# Patient Record
Sex: FEMALE | Race: OTHER | NOT HISPANIC OR LATINO
[De-identification: names, ages, dates, MRNs, and addresses within clinical notes are randomized per-mention and may not be internally consistent; named-entity substitution may affect disease eponyms.]

---

## 2021-04-22 PROBLEM — Z00.00 ENCOUNTER FOR PREVENTIVE HEALTH EXAMINATION: Status: ACTIVE | Noted: 2021-04-22

## 2021-04-23 ENCOUNTER — APPOINTMENT (OUTPATIENT)
Dept: GERIATRICS | Facility: CLINIC | Age: 81
End: 2021-04-23
Payer: MEDICARE

## 2021-04-23 VITALS
OXYGEN SATURATION: 100 % | TEMPERATURE: 97.9 F | BODY MASS INDEX: 1.14 KG/M2 | SYSTOLIC BLOOD PRESSURE: 120 MMHG | HEART RATE: 90 BPM | HEIGHT: 58 IN | WEIGHT: 5.44 LBS | DIASTOLIC BLOOD PRESSURE: 60 MMHG

## 2021-04-23 DIAGNOSIS — F41.9 ANXIETY DISORDER, UNSPECIFIED: ICD-10-CM

## 2021-04-23 DIAGNOSIS — E78.5 HYPERLIPIDEMIA, UNSPECIFIED: ICD-10-CM
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
## 2021-04-23 DIAGNOSIS — F51.04 PSYCHOPHYSIOLOGIC INSOMNIA: ICD-10-CM

## 2021-04-23 PROCEDURE — 99205 OFFICE O/P NEW HI 60 MIN: CPT

## 2021-04-23 RX ORDER — CHOLECALCIFEROL (VITAMIN D3) 125 MCG
125 MCG CAPSULE ORAL
Qty: 90 | Refills: 3 | Status: ACTIVE | COMMUNITY
Start: 2021-04-23

## 2021-04-23 NOTE — PHYSICAL EXAM
[General Appearance - Alert] : alert [General Appearance - In No Acute Distress] : in no acute distress [General Appearance - Well Nourished] : well nourished [General Appearance - Well Developed] : well developed [Sclera] : the sclera and conjunctiva were normal [PERRL With Normal Accommodation] : pupils were equal in size, round, and reactive to light [Extraocular Movements] : extraocular movements were intact [Normal Oral Mucosa] : normal oral mucosa [No Oral Pallor] : no oral pallor [Neck Appearance] : the appearance of the neck was normal [Neck Cervical Mass (___cm)] : no neck mass was observed [Jugular Venous Distention Increased] : there was no jugular-venous distention [Respiration, Rhythm And Depth] : normal respiratory rhythm and effort [Exaggerated Use Of Accessory Muscles For Inspiration] : no accessory muscle use [Auscultation Breath Sounds / Voice Sounds] : lungs were clear to auscultation bilaterally [Apical Impulse] : the apical impulse was normal [Heart Rate And Rhythm] : heart rate was normal and rhythm regular [Heart Sounds] : normal S1 and S2 [Heart Sounds Gallop] : no gallops [Bowel Sounds] : normal bowel sounds [Abdomen Soft] : soft [Abdomen Tenderness] : non-tender [Cervical Lymph Nodes Enlarged Posterior Bilaterally] : posterior cervical [Cervical Lymph Nodes Enlarged Anterior Bilaterally] : anterior cervical [No CVA Tenderness] : no ~M costovertebral angle tenderness [Abnormal Walk] : normal gait [Nail Clubbing] : no clubbing  or cyanosis of the fingernails [Musculoskeletal - Swelling] : no joint swelling seen [Skin Color & Pigmentation] : normal skin color and pigmentation [Skin Turgor] : normal skin turgor [] : no rash [Affect] : the affect was normal [Mood] : the mood was normal

## 2021-04-23 NOTE — ASSESSMENT
[FreeTextEntry1] : check labs now - fasting\par start tylenol 1000mg BID standing up to TID PRN\par advil 1-2 times a day always with food\par given name for pain management to make appointment\par goal is to obtain records from all doctors [place] and [place]\par will get back to me about doctors names etc\par obtain vaccine records\par in future [facility] Annual physical
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
## 2021-04-23 NOTE — REVIEW OF SYSTEMS
[Fever] : no fever [Chills] : no chills [Eyesight Problems] : no eyesight problems [Discharge From Eyes] : no purulent discharge from the eyes [Nosebleeds] : no nosebleeds [Nasal Discharge] : no nasal discharge [Chest Pain] : no chest pain [Palpitations] : no palpitations [Cough] : no cough [SOB on Exertion] : no shortness of breath during exertion [Abdominal Pain] : no abdominal pain [Vomiting] : no vomiting [Pelvic Pain] : no pelvic pain [Dysmenorrhea] : no dysmenorrhea [Joint Swelling] : no joint swelling [Joint Stiffness] : no joint stiffness [Itching] : no itching [Change In A Mole] : no change in a mole [Confused] : no confusion [Convulsions] : no convulsions [Sleep Disturbances] : sleep disturbances [Anxiety] : anxiety [Depression] : no depression

## 2021-04-28 ENCOUNTER — RESULT REVIEW (OUTPATIENT)
Age: 81
End: 2021-04-28

## 2021-12-21 ENCOUNTER — APPOINTMENT (OUTPATIENT)
Dept: GERIATRICS | Facility: CLINIC | Age: 81
End: 2021-12-21
Payer: MEDICARE

## 2021-12-21 ENCOUNTER — LABORATORY RESULT (OUTPATIENT)
Age: 81
End: 2021-12-21

## 2021-12-21 VITALS
BODY MASS INDEX: 20.6 KG/M2 | DIASTOLIC BLOOD PRESSURE: 62 MMHG | HEART RATE: 81 BPM | WEIGHT: 89 LBS | OXYGEN SATURATION: 98 % | TEMPERATURE: 97.4 F | HEIGHT: 55 IN | SYSTOLIC BLOOD PRESSURE: 134 MMHG

## 2021-12-21 DIAGNOSIS — R53.1 WEAKNESS: ICD-10-CM

## 2021-12-21 PROCEDURE — 99214 OFFICE O/P EST MOD 30 MIN: CPT

## 2021-12-21 RX ORDER — MIRABEGRON 50 MG/1
50 TABLET, FILM COATED, EXTENDED RELEASE ORAL
Qty: 90 | Refills: 0 | Status: DISCONTINUED | COMMUNITY
Start: 2021-04-23 | End: 2021-12-21

## 2021-12-21 NOTE — REVIEW OF SYSTEMS
[Feeling Tired] : feeling tired [Eyesight Problems] : eyesight problems [Incontinence] : incontinence [Joint Swelling] : joint swelling [Difficulty Walking] : difficulty walking [Sleep Disturbances] : sleep disturbances [Anxiety] : anxiety [Negative] : Heme/Lymph [Depression] : depression [FreeTextEntry2] : probs sleep [FreeTextEntry9] : hands

## 2021-12-21 NOTE — ASSESSMENT
[FreeTextEntry1] : Dr LYMAN 0 4/28/21\par check labs now - fasting\par start tylenol 1000mg BID standing up to TID PRN\par advil 1-2 times a day always with food\par given name for pain management to make appointment\par goal is to obtain records from all doctors [place] and [place]\par will get back to me about doctors names etc\par obtain vaccine records\par in future [facility] Annual physical\par \par 12/21/21\par \par mms - 27/28 - does not know name of hospital\par gds 15/30\par recheck labs - h/o anemia\par check iron\par refer to endocrine for osteoporosis\par trial of cymbalta for pain/depression\par to see Dr Martin re possible NPH\par [facility] - [facility]  pre[facility]\par retina specialist - has appt\par did health care proxy - daughter\par gave MOlst
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
## 2021-12-21 NOTE — PHYSICAL EXAM
[Normal Outer Ear/Nose] : the ears and nose were normal in appearance [Normal] : normal skin color and pigmentation [No Focal Deficits] : no focal deficits [Oriented To Time, Place, And Person] : oriented to person, place, and time [de-identified] : walks slowly - not magnetic, not wide based, I shortened her cane - was too high [de-identified] : trace edema [de-identified] : c section longitudal scar [de-identified] : kypho scloiosis [de-identified] : slow stooped gait - not magnetic, not wide based [MentalStatusTotal] : 26/27

## 2021-12-22 LAB
BASOPHILS # BLD AUTO: 0.04 K/UL
BASOPHILS NFR BLD AUTO: 0.4 %
EOSINOPHIL # BLD AUTO: 0.04 K/UL
EOSINOPHIL NFR BLD AUTO: 0.4 %
FERRITIN SERPL-MCNC: 6 NG/ML
HCT VFR BLD CALC: 26.6 %
HGB BLD-MCNC: 7.8 G/DL
IMM GRANULOCYTES NFR BLD AUTO: 0.2 %
IRON SATN MFR SERPL: 3 %
IRON SERPL-MCNC: 12 UG/DL
LYMPHOCYTES # BLD AUTO: 1.63 K/UL
LYMPHOCYTES NFR BLD AUTO: 14.9 %
MAN DIFF?: NORMAL
MCHC RBC-ENTMCNC: 19.8 PG
MCHC RBC-ENTMCNC: 29.3 GM/DL
MCV RBC AUTO: 67.5 FL
MONOCYTES # BLD AUTO: 0.8 K/UL
MONOCYTES NFR BLD AUTO: 7.3 %
NEUTROPHILS # BLD AUTO: 8.4 K/UL
NEUTROPHILS NFR BLD AUTO: 76.8 %
PLATELET # BLD AUTO: 475 K/UL
RBC # BLD: 3.94 M/UL
RBC # FLD: 17.6 %
TIBC SERPL-MCNC: 420 UG/DL
UIBC SERPL-MCNC: 408 UG/DL
WBC # FLD AUTO: 10.93 K/UL

## 2022-02-03 ENCOUNTER — NON-APPOINTMENT (OUTPATIENT)
Age: 82
End: 2022-02-03

## 2022-02-07 ENCOUNTER — RESULT REVIEW (OUTPATIENT)
Age: 82
End: 2022-02-07

## 2022-02-07 ENCOUNTER — APPOINTMENT (OUTPATIENT)
Dept: ENDOCRINOLOGY | Facility: CLINIC | Age: 82
End: 2022-02-07
Payer: COMMERCIAL

## 2022-02-07 VITALS
HEART RATE: 78 BPM | OXYGEN SATURATION: 98 % | HEIGHT: 55 IN | SYSTOLIC BLOOD PRESSURE: 150 MMHG | BODY MASS INDEX: 19.44 KG/M2 | DIASTOLIC BLOOD PRESSURE: 70 MMHG | WEIGHT: 84 LBS

## 2022-02-07 PROCEDURE — 99245 OFF/OP CONSLTJ NEW/EST HI 55: CPT

## 2022-02-07 PROCEDURE — 99205 OFFICE O/P NEW HI 60 MIN: CPT

## 2022-02-07 RX ORDER — CALCIUM CARBONATE 500 MG/1
TABLET, CHEWABLE ORAL
Refills: 0 | Status: ACTIVE | COMMUNITY

## 2022-02-07 RX ORDER — ASCORBIC ACID 125 MG
TABLET,CHEWABLE ORAL
Refills: 0 | Status: ACTIVE | COMMUNITY

## 2022-02-07 RX ORDER — DULOXETINE HYDROCHLORIDE 30 MG/1
30 CAPSULE, DELAYED RELEASE PELLETS ORAL
Qty: 30 | Refills: 5 | Status: DISCONTINUED | COMMUNITY
Start: 2021-12-21 | End: 2022-02-07

## 2022-02-07 NOTE — HISTORY OF PRESENT ILLNESS
[FreeTextEntry1] : 81-year-old lady accompanied by her daughter to establish care for osteoporosis.  Osteoporosis has been diagnosed on basis of likely DEXA scan as well as she has had history of multiple fractures compression vertebral fractures.  I am still trying to retrieve the records but as per the patient's history she was on alendronate from 19 90-90 95.  Thereafter she was likely placed on Forteo for approximately 2 years.  Since then she has been transition to Reclast most likely and has received 2 doses of Reclast.  The last one was approximately a year back.  She complains of pain in her back specifically at the juncture of thoracic and lumbar vertebrae and right-sided she [place] pain.  She denies any dental complications with the treatment.  She also denies any pain specifically in her groin or pain worsening on walking.  Because of her chronic pain and urinary incontinence the overall quality of life is really poor.  She is also prone to falls and has been using a cane to ambulate.  Important's note she had an tibial stress fractures few years back in spite of treatment with Forteo plus Reclast combination.  There is no history of parathyroid disease thyroid dysfunction organ transplant celiac disease cancer radiation treatments or use of long-term seizure medications she has had prednisone short but no long-term steroid use.  She underwent menopause at the age of 52 mother has osteoporosis.  Height loss is noted.  She does suffer from heartburn and reflux disease.  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  She is taking calcium carbonate Tums and his diet is pretty focused on calcium intake.

## 2022-02-10 ENCOUNTER — RESULT REVIEW (OUTPATIENT)
Age: 82
End: 2022-02-10

## 2022-02-11 ENCOUNTER — NON-APPOINTMENT (OUTPATIENT)
Age: 82
End: 2022-02-11

## 2022-02-11 LAB
24R-OH-CALCIDIOL SERPL-MCNC: 46.1 PG/ML
25(OH)D3 SERPL-MCNC: 37.8 NG/ML
ALBUMIN SERPL ELPH-MCNC: 4.3 G/DL
ANION GAP SERPL CALC-SCNC: 14 MMOL/L
BUN SERPL-MCNC: 16 MG/DL
CALCIUM SERPL-MCNC: 8.8 MG/DL
CALCIUM SERPL-MCNC: 8.8 MG/DL
CHLORIDE SERPL-SCNC: 95 MMOL/L
CO2 SERPL-SCNC: 25 MMOL/L
CREAT SERPL-MCNC: 0.39 MG/DL
DEPRECATED KAPPA LC FREE/LAMBDA SER: 1.76 RATIO
ESTIMATED AVERAGE GLUCOSE: 97 MG/DL
FREE KAPPA URINE: 23.28 MG/L
FREE KAPPA/LAMDA RATIO: 6.69 RATIO
FREE LAMDA URINE: 3.48 MG/L
GLUCOSE SERPL-MCNC: 88 MG/DL
HBA1C MFR BLD HPLC: 5 %
KAPPA LC CSF-MCNC: 0.86 MG/DL
KAPPA LC SERPL-MCNC: 1.51 MG/DL
OSMOLALITY SERPL: 276 MOSMOL/KG
OSMOLALITY UR: 729 MOSM/KG
PARATHYROID HORMONE INTACT: 50 PG/ML
PHOSPHATE SERPL-MCNC: 1.4 MG/DL
POTASSIUM SERPL-SCNC: 3.8 MMOL/L
SODIUM SERPL-SCNC: 134 MMOL/L
TSH SERPL-ACNC: 3.4 UIU/ML

## 2022-02-14 ENCOUNTER — RESULT REVIEW (OUTPATIENT)
Age: 82
End: 2022-02-14

## 2022-02-14 ENCOUNTER — TRANSCRIPTION ENCOUNTER (OUTPATIENT)
Age: 82
End: 2022-02-14

## 2022-02-14 LAB — M PROTEIN SPEC IFE-MCNC: NORMAL

## 2022-02-15 LAB — ALP BONE SERPL-MCNC: 33.1 UG/L

## 2022-02-16 ENCOUNTER — NON-APPOINTMENT (OUTPATIENT)
Age: 82
End: 2022-02-16

## 2022-02-16 ENCOUNTER — APPOINTMENT (OUTPATIENT)
Dept: PAIN MANAGEMENT | Facility: CLINIC | Age: 82
End: 2022-02-16
Payer: MEDICARE

## 2022-02-16 VITALS
HEIGHT: 55 IN | WEIGHT: 84 LBS | DIASTOLIC BLOOD PRESSURE: 74 MMHG | BODY MASS INDEX: 19.44 KG/M2 | TEMPERATURE: 97.6 F | SYSTOLIC BLOOD PRESSURE: 130 MMHG

## 2022-02-16 DIAGNOSIS — Z78.9 OTHER SPECIFIED HEALTH STATUS: ICD-10-CM

## 2022-02-16 PROCEDURE — 99244 OFF/OP CNSLTJ NEW/EST MOD 40: CPT

## 2022-02-16 PROCEDURE — 99214 OFFICE O/P EST MOD 30 MIN: CPT

## 2022-02-16 NOTE — CONSULT LETTER
[Dear  ___] : Dear  [unfilled], [Consult Letter:] : I had the pleasure of evaluating your patient, [unfilled]. [Please see my note below.] : Please see my note below. [Consult Closing:] : Thank you very much for allowing me to participate in the care of this patient.  If you have any questions, please do not hesitate to contact me. [Sincerely,] : Sincerely, [FreeTextEntry3] : Holden Rachel MD\par

## 2022-02-16 NOTE — ASSESSMENT
[FreeTextEntry1] : 82 yof w/ severe mid and low back pain.\par \par I have personally reviewed the patient's xray in detail and discussed it with them which is significant for multiple compression deformities.\par \par The patient has failed to have relief with over six weeks of physical therapy within the last three months and all medications. GIven their failure to improve with all other conservative measures recommend MRI lumbar and thoracic spine. Patient will return to review imaging and plan for potential intervention.\par \par Physical therapy prescribed - goal will be to increase ROM, strengthening, postural training, other modalities ad poly which may include massage and stim. Goals of therapy discussed with the patient in detail and will be discussed with physical therapist. Patient will follow-up following course of physical therapy to monitor progress and adjust therapy as needed.\par \par Acetaminophen 1,000 mg q8h prn for moderate pain. Risks, benefits, and alternatives of acetaminophen discussed with patient.\par \par Ibuprofen 600 mg q8h prn add when pain is not adequately controlled with acetaminophen. Risks, benefits, and alternatives of ibuprofen discussed with patient.\par \par Diet and nutritional strategies discussed which may improve patients pain and will improve overall health.\par \par

## 2022-02-16 NOTE — HISTORY OF PRESENT ILLNESS
[___ yrs] : [unfilled] year(s) ago [Constant] : constant [7] : an average pain level of 7/10 [8] : a minimum pain level of 8/10 [9] : a maximum pain level of 9/10 [Sharp] : sharp [Throbbing] : throbbing [Shooting] : shooting [Transitioning] : transitioning [Medications] : medications [Heat] : heat [FreeTextEntry1] : 82 yof presents w/ severe mid and low back pain. Pain worsened severely two weeks ago. Quality of life is impaired. There has been a severe exacerbation of the patient's chronic pain.  [FreeTextEntry2] : 27 [FreeTextEntry7] : Referred by Dr. Bennett. Back pain radiates down right and left legs .

## 2022-02-16 NOTE — PHYSICAL EXAM

## 2022-02-16 NOTE — REVIEW OF SYSTEMS
[Back Pain] : back pain [Radiating Pain] : radiating pain [Joint Pain] : joint pain [Joint Stiffness] : joint stiffness [Numbness] : numbness [Negative] : Heme/Lymph

## 2022-02-16 NOTE — DATA REVIEWED
[FreeTextEntry1] : \par  [place] X-Ray Report             Final\par \par No Documents Attached\par \par \par \par \par   [facility]\par                                          [address] [place]\par                                    [place], [place]  [address]\par                                        Department of Radiology\par                                             [phone]\par \par \par Patient Name:      CHICA MARTINEZ                  Location:       [facility]\par Med Rec #:        [id number]                    Account #:      [id number]\par YOB: 1940                    Ordering:       Mara Lau MD\par Age: 82               Sex:    F                 Attending:      Mara Lau MD\par PCP:        Suha Bennett MD\par ______________________________________________________________________________________\par \par Exam Date:      02/10/22\par Exam:         XR L S SPINE AP LAT; XR THORACIC SPINE (COMMON)\par \par Order#:       [id number]; [id number]\par \par \par \par Clinical indication: Osteoporosis, back pain\par \par  Technique: 2 views of the thoracic spine and 3 views of the lumbar spine\par \par  Comparison: None available\par \par  Findings:\par \par  There is no definite acute loss of vertebral body height. However, there is severe compression\par deformity of L2 as well as mild chronic appearing compression deformities of T12 and L1. There is\par also a chronic appearing severe compression deformity of T5. There is moderate extra scoliotic\par curvature at the thoracolumbar junction. There is no significant listhesis. There is multilevel\par discogenic degenerative disease of the lumbar spine as manifested by disc space narrowing and\par endplate osteophytosis, most pronounced at L3-L4, L4-L5 and L5-S1.\par \par \par  Impression:\par \par  No definite acute loss of vertebral body height. There are chronic appearing compression\par deformities of T5, T12, L1 and L2.\par \par  Discogenic degenerative disease, most pronounced at L3-L4, L4-L5 and L5-S1.\par \par \par

## 2022-02-17 LAB — COLLAGEN CTX SERPL-MCNC: 215 PG/ML

## 2022-02-18 ENCOUNTER — APPOINTMENT (OUTPATIENT)
Dept: NEPHROLOGY | Facility: CLINIC | Age: 82
End: 2022-02-18

## 2022-02-28 ENCOUNTER — APPOINTMENT (OUTPATIENT)
Dept: ENDOCRINOLOGY | Facility: CLINIC | Age: 82
End: 2022-02-28
Payer: COMMERCIAL

## 2022-02-28 PROCEDURE — 99214 OFFICE O/P EST MOD 30 MIN: CPT

## 2022-02-28 NOTE — HISTORY OF PRESENT ILLNESS
[Home] : at home, [unfilled] , at the time of the visit. [Other Location: e.g. Home (Enter Location, City,State)___] : at [unfilled] [Verbal consent obtained from patient] : the patient, [unfilled] [FreeTextEntry1] : 81-year-old lady accompanied by her daughter to establish care for osteoporosis.  Osteoporosis has been diagnosed on basis of likely DEXA scan as well as she has had history of multiple fractures compression vertebral fractures.  I am still trying to retrieve the records but as per the patient's history she was on alendronate from 19 90-90 95.  Thereafter she was likely placed on Forteo for approximately 2 years.  Since then she has been transition to Reclast most likely and has received 2 doses of Reclast.  The last one was approximately a year back.  She complains of pain in her back specifically at the juncture of thoracic and lumbar vertebrae and right-sided she [place] pain.  She denies any dental complications with the treatment.  She also denies any pain specifically in her groin or pain worsening on walking.  Because of her chronic pain and urinary incontinence the overall quality of life is really poor.  She is also prone to falls and has been using a cane to ambulate.  Important's note she had an tibial stress fractures few years back in spite of treatment with Forteo plus Reclast combination.  There is no history of parathyroid disease thyroid dysfunction organ transplant celiac disease cancer radiation treatments or use of long-term seizure medications she has had prednisone short but no long-term steroid use.  She underwent menopause at the age of 52 mother has osteoporosis.  Height loss is noted.  She does suffer from heartburn and reflux disease.  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  She is taking calcium carbonate Tums and his diet is pretty focused on calcium intake.\par \par \par 02/28/2022- FOLLOW UP\par Discussed most recent lab results with the patient.  No unexpected findings on the x-ray patient getting MRI by pain management still troubled by the pain.  To best of her knowledge patient has received 2 Reclast doses and is due for the third 1.  Patient and her daughter will arrange for me to review the last endocrine notes.  She has been having hypophosphatemia cause unknown.  I have advised her to replenish by oral supplementation, but patient is hesitant due to fluid concerns.  Electrolyte imbalances noted advised to establish with nephrology as well.  No obvious endocrinopathy noted to explain polyuria polydipsia.  Discussed with the patient to maintain optimal calcium in the diet.  Advised to get dental evaluation.  She has tolerated Reclast well on prior dosing.  I will mobilize to set up Reclast dosing.

## 2022-03-07 ENCOUNTER — APPOINTMENT (OUTPATIENT)
Dept: NEPHROLOGY | Facility: CLINIC | Age: 82
End: 2022-03-07
Payer: MEDICARE

## 2022-03-07 ENCOUNTER — RESULT REVIEW (OUTPATIENT)
Age: 82
End: 2022-03-07

## 2022-03-07 VITALS
OXYGEN SATURATION: 98 % | RESPIRATION RATE: 17 BRPM | DIASTOLIC BLOOD PRESSURE: 58 MMHG | HEIGHT: 55 IN | BODY MASS INDEX: 19.9 KG/M2 | SYSTOLIC BLOOD PRESSURE: 118 MMHG | WEIGHT: 86 LBS | TEMPERATURE: 97.8 F | HEART RATE: 88 BPM

## 2022-03-07 DIAGNOSIS — N32.81 OVERACTIVE BLADDER: ICD-10-CM

## 2022-03-07 PROCEDURE — P9615: CPT

## 2022-03-07 PROCEDURE — 36415 COLL VENOUS BLD VENIPUNCTURE: CPT

## 2022-03-07 PROCEDURE — 99204 OFFICE O/P NEW MOD 45 MIN: CPT

## 2022-03-08 ENCOUNTER — TRANSCRIPTION ENCOUNTER (OUTPATIENT)
Age: 82
End: 2022-03-08

## 2022-03-08 PROBLEM — N32.81 OVERACTIVE BLADDER: Status: ACTIVE | Noted: 2021-04-23

## 2022-03-08 NOTE — PHYSICAL EXAM
[General Appearance - Alert] : alert [General Appearance - In No Acute Distress] : in no acute distress [Extraocular Movements] : extraocular movements were intact [Jugular Venous Distention Increased] : there was no jugular-venous distention [Respiration, Rhythm And Depth] : normal respiratory rhythm and effort [Exaggerated Use Of Accessory Muscles For Inspiration] : no accessory muscle use [Auscultation Breath Sounds / Voice Sounds] : lungs were clear to auscultation bilaterally [Heart Rate And Rhythm] : heart rate was normal and rhythm regular [Heart Sounds] : normal S1 and S2 [No CVA Tenderness] : no ~M costovertebral angle tenderness [Musculoskeletal - Swelling] : no joint swelling seen [] : no rash [No Focal Deficits] : no focal deficits [Oriented To Time, Place, And Person] : oriented to person, place, and time [FreeTextEntry1] : bilateral but bilateral paravertebral tenderness in thoracic and lumbar areas

## 2022-03-08 NOTE — REASON FOR VISIT
[Consultation] : a consultation visit [Family Member] : family member [FreeTextEntry1] : electrolyte abnormalities

## 2022-03-08 NOTE — HISTORY OF PRESENT ILLNESS
[FreeTextEntry1] : 81 yo woman w/ PMHx of chronic hyponatremia, phosphatemia, osteoporosis, multiple compression vertebral fractures (accident in May 2019) with chronic back pain, GERD, anemia.\par \par admits to having chronic hyponatremia with sNa running in 133-134 and hypophosphatemia\par was advised to replenish phosphate by oral supplementation, but hesitant \par \par she admits to long time increased fluid intake up to ~2-2.5L/ day that was cut down to ~1.4-1.5L/ day given chronic hyponatremia and frequent urination, specially coffee that makes her feel stronger \par patient eats small portions of food with overall decreased oral food intake and weight loss\par she is taking calcium carbonate/ Tums and her diet is pretty focused on calcium intake\par also is taking Vit D supplements \par denies nausea, vomiting, diarrhea \par admits to polyuria with urge to urinate, incontinence, nocturia\par urination is frequent ~q2hr making her awake with lack of sleep\par denies any pain or burning sensation or feeling of incomplete emptying\par denies hematuria, frothy urine; urine is light/ diluted \par chronic back pain, specifically at fracture sites and right-sided sciatica pain (accident in May 2019)\par taking tylenol and advil 200 mg tid\par also prone to falls, using a cane/ walker to ambulate\par suffers from heartburn and reflux disease, taking Tums, denies H2-blockers, PPIs intake\par because of her chronic pain and urinary increased frequency/ incontinence the overall quality of life is really poor\par \par no history of parathyroid disease, radiation treatments or long-term steroid use\par negative paraproteinemia/ MM workup \par denies smoking, occasional EtOH intake\par menopause at the age of 52\par family hx - mother has osteoporosis\par  \par \par \par \par \par \par
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
## 2022-03-10 ENCOUNTER — RESULT REVIEW (OUTPATIENT)
Age: 82
End: 2022-03-10

## 2022-03-15 ENCOUNTER — APPOINTMENT (OUTPATIENT)
Dept: PAIN MANAGEMENT | Facility: CLINIC | Age: 82
End: 2022-03-15
Payer: COMMERCIAL

## 2022-03-15 VITALS
WEIGHT: 86 LBS | DIASTOLIC BLOOD PRESSURE: 80 MMHG | TEMPERATURE: 98 F | HEIGHT: 55 IN | SYSTOLIC BLOOD PRESSURE: 154 MMHG | BODY MASS INDEX: 19.9 KG/M2

## 2022-03-15 DIAGNOSIS — M79.10 MYALGIA, UNSPECIFIED SITE: ICD-10-CM

## 2022-03-15 PROCEDURE — 99214 OFFICE O/P EST MOD 30 MIN: CPT

## 2022-03-15 NOTE — ASSESSMENT
[FreeTextEntry1] : 82 yof w/ severe mid and low back pain.\par \par I have personally reviewed the patient's MRI in detail and discussed it with them which is significant for a severe L1 fracture as well as T10. \par \par I do think that she is a candidate for kyphoplasty if she desires it, I expressed to her that the fracture will heal on its own as well. She will discuss with family.\par \par Physical therapy prescribed - goal will be to increase ROM, strengthening, postural training, other modalities ad poly which may include massage and stim. Goals of therapy discussed with the patient in detail and will be discussed with physical therapist. Patient will follow-up following course of physical therapy to monitor progress and adjust therapy as needed.\par \par Acetaminophen 1,000 mg q8h prn for moderate pain. Risks, benefits, and alternatives of acetaminophen discussed with patient.\par

## 2022-03-15 NOTE — PHYSICAL EXAM

## 2022-03-15 NOTE — DATA REVIEWED
[FreeTextEntry1] :  03/10/22\par Exam:         MRI THORACIC SPINE\par \par       **************A D D E N D U M**************\par L1 severe acute compression fracture with edema the bilateral pedicles. T10 mild subacute to chronic\par compression fracture.\par \par  --- End of Report ---\par \par ***Electronically Signed ***\par -----------------------------------------------\par Radha Raphael MD        03/11/22\par \par \par Signed on 03/11/22 1245\par Edited by: Radha Raphael MD\par \par \par \par  MRI of the thoracic spine was performed utilizing sagittal  T1, axial and sagittal T2-weighted and\par axial gradient echo images as well as sagittal STIR images.\par \par  There are no prior studies available for comparison.\par \par  Findings: There is a moderate chronic anterior compression fracture of the T4 vertebral body. There\par is a mild chronic compression fracture of the T5 vertebral body. There is a mild chronic compression\par fracture of the T10 vertebral body. There is loss of height of the inferior endplate of T12\par consistent with a moderate chronic compression fracture. There is a severe chronic compression\par fracture of the L1 vertebral body. There is mild retropulsion of fracture fragments mildly effacing\par the ventral thecal sac at the L1 level.\par \par  There is dextroscoliosis of the thoracic spine with apex at L1/L2. There is mild kyphosis of the\par thoracic spine. The remaining vertebral bodies are of normal height. There is loss of disc height\par and T2 signal at the T5/T6 T6/T7, T7/T8, T8/T9 disc spaces consistent with desiccation. There are\par mild surrounding Modic changes at the anterior endplates of T5/T6 T6/T7 and T7/T8 and T8/T9. The\par remaining intervertebral discs spaces demonstrate mild desiccation. There is a tiny hemangioma seen\par within the T10 left posterior vertebral body. Evaluation of the spinal cord demonstrates no evidence\par of abnormal signal changes and terminates at the T12/L1 level.\par \par  Evaluation of the individual levels demonstrates no evidence of a focal disc herniation or spinal\par cord compression.\par \par  There is a hiatus hernia.\par \par \par  Impression:\par \par  T4 moderate chronic anterior compression fracture. Mild T5 chronic compression fracture. Mild\par chronic compression fracture of the T10 vertebral body. T12 moderate chronic compression fracture..\par Severe chronic compression fracture of the L1 vertebral body with mild retropulsion of fracture\par fragments mildly effacing the ventral thecal sac at the L1 level.\par  No evidence of spinal cord compression.\par \par  Multilevel degenerative changes of the thoracic spine.\par \par  --- End of Report ---\par \par ***Electronically Signed ***\par -----------------------------------------------\par Radha Raphael MD              03/11/22 1131\par \par Dictated on 03/11/22\par \par \par \par  [facility] MRI Report             Final\par \par No Documents Attached\par \par \par \par \par   [facility]\par                                          [address]\par                                    [place], [place]  [address]\par                                        Department of Radiology\par                                             [phone]\par \par \par Patient Name:      CHICA MARTINEZ                  Location:       [facility]\par Med Rec #:        [id number]                    Account #:      [id number]\par YOB: 1940                    Ordering:       Holden Rachel MD\par Age: 82               Sex:    F                 Attending:      Holden Rachel MD\par PCP:        Suha Bennett MD\par ______________________________________________________________________________________\par \par Exam Date:      03/10/22\par Exam:         MRI LUMBAR SPINE\par Order#:       [id number]\par \par \par \par Back pain.\par \par  Technique: MRI OF THE LUMBAR SPINE WITHOUT CONTRAST.\par  MRI of the lumbar spine was performed utilizing axial and sagittal T1 and T2-weighted images.\par Sagittal STIR images were also acquired.\par \par  Findings: There are no prior studies available for comparison.\par \par  There is partial sacralization of the L5 vertebral body. The most inferior complete intravertebral\par disc space has been annotated as the L5/S1 space. The disc space of the above this disc space has\par been annotated as the L4/L5 disc space. If surgical manipulation is contemplated, would recommend\par comparison of this study to plain films of the entire spine for counting purposes.\par \par  There is a severe acute compression fracture of the L1 vertebral body with mild retropulsion of\par fracture fragments posteriorly\par \par  There is mild levoscoliosis of the lumbar spine. There is grade 1 anterolisthesis of L4 on L5.\par There is straightening of the lumbar lordosis. The vertebral bodies are normal height. There is\par moderate to severe loss of disc height at the L4/L5 disc space with mild anterior osteophyte\par formation. There is moderate loss of disc height and T2 signal within the L3/L4 and L4/L5 disc\par spaces consistent with desiccation.. The remaining intervertebral disc spaces are within normal\par limits. The conus terminates at the L1 level and demonstrates no evidence of abnormal signal\par changes.\par \par  Evaluation of the individual levels demonstrates at L5/S1 there is no evidence of focal disc\par herniation or spinal canal stenosis.\par \par  At the L4/L5 level there is unroofing of the posterior aspect of the disc space due to the\par anterolisthesis. There is a superimposed diffuse disc bulge compressing the right L4 foraminal\par exiting nerve root. There is moderate to severe facet and ligamentous hypertrophy. There is mild\par left foraminal narrowing. The left L4 exiting nerve root is within normal limits. There is no\par evidence of spinal canal stenosis.\par \par  At the L3/L4 level there is a mild diffuse disc bulge flattening the ventral thecal sac. There is\par mild bilateral foraminal narrowing. The bilateral L3 foraminal exiting nerve roots are within normal\par limits. There is mild facet degenerative changes. There is no evidence of spinal canal stenosis.\par \par  At the L2/L3 level there is a mild diffuse disc bulge flattening the ventral thecal sac. There is\par mild to moderate right and mild left foraminal narrowing. The bilateral L3 foraminal exiting nerve\par roots are within normal limits. There is mild facet degenerative changes. There is fluid seen in the\par facet joints. There is no evidence of spinal canal stenosis.\par \par  At the L1/L2 level there is mild retropulsion of fracture fragments flattening the ventral thecal\par sac. There is moderate to severe left foraminal narrowing. There is mild right foraminal narrowing.\par There is mild to moderate facet and ligamentous hypertrophy. There is no evidence of spinal canal\par stenosis.\par \par \par  Impression:\par  PARTIAL SACRALIZATION OF THE L5 VERTEBRAL BODY. THE MOST INFERIOR COMPLETE INTRAVERTEBRAL DISC\par SPACE HAS BEEN ANNOTATED AS THE L5/S1 SPACE. THE DISC SPACE OF THE ABOVE THIS DISC SPACE HAS BEEN A\par NNOTATED AS THE L4/L5 DISC SPACE. IF SURGICAL MANIPULATION IS CONTEMPLATED, WOULD RECOMMEND\par COMPARISON OF THIS STUDY TO PLAIN FILMS OF THE ENTIRE SPINE FOR COUNTING PURPOSES.\par \par  Severe acute to subacute compression fracture of the L1 vertebral body with mild retropulsion of\par fracture fragments posteriorly\par \par \par  Mild levoscoliosis of the lumbar spine. Degenerative changes of the lumbar spine\par \par  Grade 1 anterolisthesis of L4 on L5.\par \par  L4/L5 right asymmetric diffuse disc bulge compressing the right L4 foraminal exiting nerve root.\par L4/L5 evidence of spinal canal stenosis.\par \par  L3/L4 mild diffuse disc bulge. L3/L4 no evidence of spinal canal stenosis.\par \par  L2/L3 mild diffuse disc bulge. L2/L3 no evidence of spinal canal stenosis.\par \par  L1/L2 mild retropulsion of fracture fragments with moderate to severe left foraminal narrowing.\par L1/L2 no evidence of spinal canal stenosis.\par \par  --- End of Report ---\par \par ***Electronically Signed ***\par -----------------------------------------------\par Radha Raphael MD              03/11/22 1630\par \par Dictated on 03/11/22\par \par \par Report cc:  Holden Rachel MD;\par \par  \par \par  Ordered by: HOLDEN RACHEL       Collected/Examined: 10Mar2022 04:30PM       \par Verified by: HOLDEN RACHEL 14Mar2022 01:27PM       \par  Result Communication: No patient communication needed at this time;\par Stage: Final       \par  Performed at: [facility]       Resulted: 11Mar2022 04:30PM       Last Updated: 14Mar2022 01:27PM       Accession: [id number]

## 2022-03-15 NOTE — HISTORY OF PRESENT ILLNESS
[___ yrs] : [unfilled] year(s) ago [Constant] : constant [7] : an average pain level of 7/10 [8] : a minimum pain level of 8/10 [9] : a maximum pain level of 9/10 [Sharp] : sharp [Throbbing] : throbbing [Shooting] : shooting [Transitioning] : transitioning [Medications] : medications [Heat] : heat [FreeTextEntry1] : Interval History:\par Patient returns today to review MRI. Her pain remains severe but has improved somewhat. Quality of life is impaired. There has been a severe exacerbation of the patient's chronic pain. \par \par HPI: 82 yof presents w/ severe mid and low back pain. Pain worsened severely two weeks ago. Quality of life is impaired. There has been a severe exacerbation of the patient's chronic pain.  [FreeTextEntry2] : 27 [FreeTextEntry7] : Referred by Dr. Bennett. Back pain radiates down right and left legs .

## 2022-03-16 ENCOUNTER — APPOINTMENT (OUTPATIENT)
Dept: GASTROENTEROLOGY | Facility: CLINIC | Age: 82
End: 2022-03-16
Payer: MEDICARE

## 2022-03-16 VITALS
BODY MASS INDEX: 19.9 KG/M2 | SYSTOLIC BLOOD PRESSURE: 116 MMHG | HEIGHT: 55 IN | HEART RATE: 60 BPM | DIASTOLIC BLOOD PRESSURE: 62 MMHG | TEMPERATURE: 97 F | WEIGHT: 86 LBS

## 2022-03-16 DIAGNOSIS — Z82.49 FAMILY HISTORY OF ISCHEMIC HEART DISEASE AND OTHER DISEASES OF THE CIRCULATORY SYSTEM: ICD-10-CM

## 2022-03-16 DIAGNOSIS — R19.5 OTHER FECAL ABNORMALITIES: ICD-10-CM

## 2022-03-16 PROCEDURE — 99204 OFFICE O/P NEW MOD 45 MIN: CPT

## 2022-03-16 RX ORDER — IBUPROFEN 200 MG/1
200 TABLET, COATED ORAL
Refills: 0 | Status: ACTIVE | COMMUNITY

## 2022-03-16 RX ORDER — ACETAMINOPHEN 325 MG/1
325 TABLET, FILM COATED ORAL
Refills: 0 | Status: ACTIVE | COMMUNITY

## 2022-03-16 RX ORDER — POTASSIUM PHOSPHATE, MONOBASIC 500 MG/1
500 TABLET, SOLUBLE ORAL 4 TIMES DAILY
Qty: 240 | Refills: 0 | Status: COMPLETED | COMMUNITY
Start: 2022-02-11 | End: 2022-03-16

## 2022-03-16 NOTE — HISTORY OF PRESENT ILLNESS
[de-identified] : CHICA MARTINEZ  is being evaluated at the request of Dr. Bennett for an opinion re: positive  Cologuard ( 2/24/2022) . HGB= 12.1 with normal MCV. Elevated  ferritin ( 1073) without  elevated transferrin saturation. Iron low at 12 \par \par Cologuard was apparently sent  to evaluate  anemia.  Patient  reports a colonoscopy in the distant  past. Gives h/o polps in the past.  Denies nausea, vomiting, fever, chills, diarrhea, constipation, melena, hematemesis, BRBPR, unexpected weight loss. Describes  long standing anemia ( thought due  to her vegetarian diet) \par

## 2022-03-16 NOTE — ASSESSMENT
[FreeTextEntry1] : Positive  Cologuard:  General follow up would be  a colonoscopy.  Patient  ( accompanied by daughter) wants to think about colonoscopy. Understands 12%  false positive rate of  Cologuard. Understands that not  performing a colonoscopy may miss / delay diagnosis  of  colon cancer  / colon polyps\par \par Pertinent available records reviewed\par

## 2022-03-21 ENCOUNTER — RESULT REVIEW (OUTPATIENT)
Age: 82
End: 2022-03-21

## 2022-03-22 ENCOUNTER — APPOINTMENT (OUTPATIENT)
Dept: GERIATRICS | Facility: CLINIC | Age: 82
End: 2022-03-22
Payer: MEDICARE

## 2022-03-22 VITALS
HEIGHT: 55 IN | TEMPERATURE: 98 F | WEIGHT: 90 LBS | HEART RATE: 64 BPM | SYSTOLIC BLOOD PRESSURE: 126 MMHG | BODY MASS INDEX: 20.83 KG/M2 | DIASTOLIC BLOOD PRESSURE: 74 MMHG | OXYGEN SATURATION: 100 %

## 2022-03-22 DIAGNOSIS — B36.9 SUPERFICIAL MYCOSIS, UNSPECIFIED: ICD-10-CM

## 2022-03-22 DIAGNOSIS — R19.5 OTHER FECAL ABNORMALITIES: ICD-10-CM

## 2022-03-22 DIAGNOSIS — M47.24 OTHER SPONDYLOSIS WITH RADICULOPATHY, THORACIC REGION: ICD-10-CM

## 2022-03-22 PROCEDURE — 99214 OFFICE O/P EST MOD 30 MIN: CPT

## 2022-03-22 NOTE — PHYSICAL EXAM
[General Appearance - Alert] : alert [General Appearance - In No Acute Distress] : in no acute distress [Sclera] : the sclera and conjunctiva were normal [Outer Ear] : the ears and nose were normal in appearance [Neck Appearance] : the appearance of the neck was normal [] : no respiratory distress [Abdomen Soft] : soft [Skin Color & Pigmentation] : normal skin color and pigmentation [Cranial Nerves] : cranial nerves 2-12 were intact [Normal Outer Ear/Nose] : the ears and nose were normal in appearance [Normal] : normal skin color and pigmentation [No Focal Deficits] : no focal deficits [Oriented To Time, Place, And Person] : oriented to person, place, and time [de-identified] : walks slowly - [de-identified] : trace edema [de-identified] : fungal rash in groin [de-identified] : kypho scloiosis [de-identified] : slow stooped gait - not magnetic, not wide based [MentalStatusTotal] : 26/27

## 2022-03-22 NOTE — REVIEW OF SYSTEMS
[As Noted in HPI] : as noted in HPI [Feeling Tired] : feeling tired [Eyesight Problems] : eyesight problems [Incontinence] : incontinence [Joint Swelling] : joint swelling [Sleep Disturbances] : sleep disturbances [Difficulty Walking] : difficulty walking [Anxiety] : anxiety [Depression] : depression [Negative] : Heme/Lymph [FreeTextEntry2] : back pain [FreeTextEntry9] : hands

## 2022-03-22 NOTE — ASSESSMENT
[FreeTextEntry1] : Dr LYMAN 0 4/28/21\par check labs now - fasting\par start tylenol 1000mg BID standing up to TID PRN\par advil 1-2 times a day always with food\par given name for pain management to make appointment\par goal is [place] records from all doctors [place] and [place]\par will get back to me about doctors names etc\par obtain vaccine records\par in future [facility] Annual physical\par \par 12/21/21\par \par mms - 27/28 - does not know name of hospital\par gds 15/30\par recheck labs - h/o anemia\par check iron\par refer to endocrine for osteoporosis\par trial of cymbalta for pain/depression\par to see Dr Martin re possible NPH\[facility]\par retina specialist - has appt\par did health care proxy - daughter\par gave MOlst\par \par 3/22/22\par pt with severe OP compression fractures\par got iron\par hb 12\par to get home pt\par mild hydro - ?stones\par thinking re coloscnopy - positive cologuard\par fungal rash - nystop\par consider lidoderm patches uses heat
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
## 2022-03-24 DIAGNOSIS — N13.30 UNSPECIFIED HYDRONEPHROSIS: ICD-10-CM

## 2022-04-05 ENCOUNTER — APPOINTMENT (OUTPATIENT)
Dept: HEMATOLOGY ONCOLOGY | Facility: CLINIC | Age: 82
End: 2022-04-05

## 2022-04-05 ENCOUNTER — TRANSCRIPTION ENCOUNTER (OUTPATIENT)
Age: 82
End: 2022-04-05

## 2022-04-19 ENCOUNTER — RESULT REVIEW (OUTPATIENT)
Age: 82
End: 2022-04-19

## 2022-04-19 ENCOUNTER — LABORATORY RESULT (OUTPATIENT)
Age: 82
End: 2022-04-19

## 2022-04-22 LAB
ANION GAP SERPL CALC-SCNC: 14 MMOL/L
APPEARANCE: ABNORMAL
BACTERIA: ABNORMAL
BILIRUBIN URINE: NEGATIVE
BLOOD URINE: NEGATIVE
BUN SERPL-MCNC: 16 MG/DL
CALCIUM OXALATE CRYSTALS: ABNORMAL
CALCIUM SERPL-MCNC: 9.4 MG/DL
CHLORIDE SERPL-SCNC: 95 MMOL/L
CO2 SERPL-SCNC: 27 MMOL/L
COLOR: YELLOW
CREAT SERPL-MCNC: 0.47 MG/DL
CREAT SPEC-SCNC: 136 MG/DL
CYSTATIN C SERPL-MCNC: 0.93 MG/L
EGFR: 96 ML/MIN/1.73M2
GFR/BSA.PRED SERPLBLD CYS-BASED-ARV: 74 ML/MIN/1.73M2
GLUCOSE QUALITATIVE U: NEGATIVE
GLUCOSE SERPL-MCNC: 76 MG/DL
HYALINE CASTS: 0 /LPF
KETONES URINE: NORMAL
LEUKOCYTE ESTERASE URINE: NEGATIVE
MAGNESIUM SERPL-MCNC: 2.2 MG/DL
MICROSCOPIC-UA: NORMAL
NITRITE URINE: NEGATIVE
PH URINE: 6.5
POTASSIUM SERPL-SCNC: 4 MMOL/L
PROTEIN URINE: ABNORMAL
RED BLOOD CELLS URINE: 3 /HPF
SODIUM ?TM SUB UR QN: 73 MMOL/L
SODIUM SERPL-SCNC: 136 MMOL/L
SPECIFIC GRAVITY URINE: 1.03
SQUAMOUS EPITHELIAL CELLS: 3 /HPF
UROBILINOGEN URINE: ABNORMAL
WHITE BLOOD CELLS URINE: 4 /HPF

## 2022-05-03 ENCOUNTER — TRANSCRIPTION ENCOUNTER (OUTPATIENT)
Age: 82
End: 2022-05-03

## 2022-05-31 ENCOUNTER — RESULT REVIEW (OUTPATIENT)
Age: 82
End: 2022-05-31

## 2022-06-02 ENCOUNTER — RESULT REVIEW (OUTPATIENT)
Age: 82
End: 2022-06-02

## 2022-06-03 ENCOUNTER — APPOINTMENT (OUTPATIENT)
Dept: GASTROENTEROLOGY | Facility: HOSPITAL | Age: 82
End: 2022-06-03

## 2022-06-07 ENCOUNTER — APPOINTMENT (OUTPATIENT)
Dept: GERIATRICS | Facility: CLINIC | Age: 82
End: 2022-06-07
Payer: MEDICARE

## 2022-06-07 VITALS
SYSTOLIC BLOOD PRESSURE: 124 MMHG | OXYGEN SATURATION: 99 % | WEIGHT: 93 LBS | HEART RATE: 69 BPM | BODY MASS INDEX: 21.52 KG/M2 | DIASTOLIC BLOOD PRESSURE: 58 MMHG | HEIGHT: 55 IN

## 2022-06-07 DIAGNOSIS — M47.817 SPONDYLOSIS W/OUT MYELOPATHY OR RADICULOPATHY, LUMBOSACRAL REGION: ICD-10-CM

## 2022-06-07 DIAGNOSIS — B35.1 TINEA UNGUIUM: ICD-10-CM

## 2022-06-07 PROCEDURE — 99214 OFFICE O/P EST MOD 30 MIN: CPT
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
## 2022-06-07 NOTE — ASSESSMENT
[FreeTextEntry1] : Dr LYMAN 0 4/28/21\par check labs now - fasting\par start tylenol 1000mg BID standing up to TID PRN\par advil 1-2 times a day always with food\par given name for pain management to make appointment\par goal is to [place] doctors [place] and [place]\par will get back to me about doctors names etc\par obtain vaccine records\par in future [facility] Annual physical\par \par 12/21/21\par \par mms - 27/28 - does not know name of hospital\par gds 15/30\par recheck labs - h/o anemia\par check iron\par refer to endocrine for osteoporosis\par trial of cymbalta for pain/depression\par to see Dr Martin re possible NPH\[facility]\par retina specialist - has appt\par did health care proxy - daughter\par gave MOlst\par \par 3/22/22\par pt with severe OP compression fractures\par got iron\par hb 12\par to get home pt\par mild hydro - ?stones\par thinking re coloscnopy - positive cologuard\par fungal rash - nystop\par consider lidoderm patches uses heat\par \par 6/7/22\par willl recheck cbc and iron studies\par colonscopy - polyps\par has gained    weight\par doing much better

## 2022-06-07 NOTE — PHYSICAL EXAM
[Normal Outer Ear/Nose] : the ears and nose were normal in appearance [Normal] : normal skin color and pigmentation [No Focal Deficits] : no focal deficits [Oriented To Time, Place, And Person] : oriented to person, place, and time [de-identified] : walks slowly - [de-identified] : trace edema [de-identified] : fungal rash in groin [de-identified] : kypho scloiosis [de-identified] : slow stooped gait - not magnetic, not wide based [MentalStatusTotal] : 26/27

## 2022-06-07 NOTE — REVIEW OF SYSTEMS
[Feeling Tired] : feeling tired [Eyesight Problems] : eyesight problems [Incontinence] : incontinence [Joint Swelling] : joint swelling [Difficulty Walking] : difficulty walking [Sleep Disturbances] : sleep disturbances [Anxiety] : anxiety [Depression] : depression [Negative] : Heme/Lymph [FreeTextEntry2] : back pain [FreeTextEntry9] : hands [FreeTextEntry1] : fungal changes in finger nails

## 2022-06-08 LAB
BASOPHILS # BLD AUTO: 0.03 K/UL
BASOPHILS NFR BLD AUTO: 0.5 %
EOSINOPHIL # BLD AUTO: 0.09 K/UL
EOSINOPHIL NFR BLD AUTO: 1.5 %
FERRITIN SERPL-MCNC: 168 NG/ML
HCT VFR BLD CALC: 38.8 %
HGB BLD-MCNC: 12.4 G/DL
IMM GRANULOCYTES NFR BLD AUTO: 0.3 %
IRON SATN MFR SERPL: 15 %
IRON SERPL-MCNC: 45 UG/DL
LYMPHOCYTES # BLD AUTO: 1.69 K/UL
LYMPHOCYTES NFR BLD AUTO: 28.8 %
MAN DIFF?: NORMAL
MCHC RBC-ENTMCNC: 29.7 PG
MCHC RBC-ENTMCNC: 32 GM/DL
MCV RBC AUTO: 92.8 FL
MONOCYTES # BLD AUTO: 0.51 K/UL
MONOCYTES NFR BLD AUTO: 8.7 %
NEUTROPHILS # BLD AUTO: 3.52 K/UL
NEUTROPHILS NFR BLD AUTO: 60.2 %
PLATELET # BLD AUTO: 288 K/UL
RBC # BLD: 4.18 M/UL
RBC # FLD: 13.8 %
TIBC SERPL-MCNC: 311 UG/DL
UIBC SERPL-MCNC: 266 UG/DL
WBC # FLD AUTO: 5.86 K/UL

## 2022-06-09 ENCOUNTER — APPOINTMENT (OUTPATIENT)
Dept: GASTROENTEROLOGY | Facility: CLINIC | Age: 82
End: 2022-06-09

## 2022-10-04 ENCOUNTER — APPOINTMENT (OUTPATIENT)
Dept: GERIATRICS | Facility: CLINIC | Age: 82
End: 2022-10-04

## 2022-10-04 VITALS
TEMPERATURE: 98.2 F | DIASTOLIC BLOOD PRESSURE: 64 MMHG | WEIGHT: 94 LBS | BODY MASS INDEX: 21.76 KG/M2 | HEART RATE: 69 BPM | HEIGHT: 55 IN | OXYGEN SATURATION: 99 % | SYSTOLIC BLOOD PRESSURE: 132 MMHG

## 2022-10-04 DIAGNOSIS — J06.9 ACUTE UPPER RESPIRATORY INFECTION, UNSPECIFIED: ICD-10-CM

## 2022-10-04 PROCEDURE — 99214 OFFICE O/P EST MOD 30 MIN: CPT

## 2022-10-04 NOTE — PHYSICAL EXAM
[Normal Outer Ear/Nose] : the ears and nose were normal in appearance [Normal] : normal skin color and pigmentation [No Focal Deficits] : no focal deficits [Oriented To Time, Place, And Person] : oriented to person, place, and time [de-identified] : walks slowly - [de-identified] : kypho scloiosis [de-identified] : slow stooped gait - not magnetic, not wide based [MentalStatusTotal] : 26/27

## 2022-10-04 NOTE — REVIEW OF SYSTEMS
[Feeling Tired] : feeling tired [Eyesight Problems] : eyesight problems [Incontinence] : incontinence [Joint Swelling] : joint swelling [Difficulty Walking] : difficulty walking [Anxiety] : anxiety [Depression] : depression [Negative] : Heme/Lymph [Sleep Disturbances] : no sleep disturbances [FreeTextEntry2] : back pain [FreeTextEntry7] : no blood in stool [FreeTextEntry9] : hands [FreeTextEntry1] : fungal changes in finger nails

## 2022-10-04 NOTE — ASSESSMENT
[FreeTextEntry1] : Dr LYMAN 0 4/28/21\par check labs now - fasting\par start tylenol 1000mg BID standing up to TID PRN\par advil 1-2 times a day always with food\par given name for pain management to make appointment\par goal is to obtain [place] all doctors [place]\par will get back to me about doctors names etc\par obtain vaccine records\par in future [facility] Annual physical\par \par 12/21/21\par \par mms - 27/28 - does not know name of hospital\par gds 15/30\par recheck labs - h/o anemia\par check iron\par refer to endocrine for osteoporosis\par trial of cymbalta for pain/depression\par to see Dr Martin re possible NPH\[facility]\par retina specialist - has appt\par did health care proxy - daughter\par gave MOlst\par \par 3/22/22\par pt with severe OP compression fractures\par got iron\par hb 12\par to get home pt\par mild hydro - ?stones\par thinking re coloscnopy - positive cologuard\par fungal rash - nystop\par consider lidoderm patches uses heat\par \par 6/7/22\par willl recheck cbc and iron studies\par colonscopy - polyps\par has gained    weight\par doing much better\par \par 10/4/22\par pt recovering from URI\par had flu and covid vaccines 2 weeks ago\par will recheck labs today\par
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
## 2022-10-05 LAB
25(OH)D3 SERPL-MCNC: 48.8 NG/ML
ALBUMIN SERPL ELPH-MCNC: 4.6 G/DL
ALP BLD-CCNC: 72 U/L
ALT SERPL-CCNC: 17 U/L
ANION GAP SERPL CALC-SCNC: 13 MMOL/L
AST SERPL-CCNC: 29 U/L
BASOPHILS # BLD AUTO: 0.02 K/UL
BASOPHILS NFR BLD AUTO: 0.3 %
BILIRUB SERPL-MCNC: <0.2 MG/DL
BUN SERPL-MCNC: 14 MG/DL
CALCIUM SERPL-MCNC: 10 MG/DL
CHLORIDE SERPL-SCNC: 92 MMOL/L
CO2 SERPL-SCNC: 26 MMOL/L
CREAT SERPL-MCNC: 0.44 MG/DL
CRP SERPL-MCNC: 5 MG/L
EGFR: 98 ML/MIN/1.73M2
EOSINOPHIL # BLD AUTO: 0.11 K/UL
EOSINOPHIL NFR BLD AUTO: 1.7 %
FERRITIN SERPL-MCNC: 84 NG/ML
GLUCOSE SERPL-MCNC: 99 MG/DL
HCT VFR BLD CALC: 39.4 %
HGB BLD-MCNC: 12.7 G/DL
IMM GRANULOCYTES NFR BLD AUTO: 0.3 %
IRON SATN MFR SERPL: 8 %
IRON SERPL-MCNC: 28 UG/DL
LYMPHOCYTES # BLD AUTO: 1.9 K/UL
LYMPHOCYTES NFR BLD AUTO: 30 %
MAN DIFF?: NORMAL
MCHC RBC-ENTMCNC: 30 PG
MCHC RBC-ENTMCNC: 32.2 GM/DL
MCV RBC AUTO: 93.1 FL
MONOCYTES # BLD AUTO: 0.88 K/UL
MONOCYTES NFR BLD AUTO: 13.9 %
NEUTROPHILS # BLD AUTO: 3.41 K/UL
NEUTROPHILS NFR BLD AUTO: 53.8 %
PLATELET # BLD AUTO: 284 K/UL
POTASSIUM SERPL-SCNC: 4.4 MMOL/L
PROT SERPL-MCNC: 6.7 G/DL
RBC # BLD: 4.23 M/UL
RBC # FLD: 13.2 %
SODIUM SERPL-SCNC: 130 MMOL/L
TIBC SERPL-MCNC: 342 UG/DL
UIBC SERPL-MCNC: 315 UG/DL
VIT B12 SERPL-MCNC: >2000 PG/ML
WBC # FLD AUTO: 6.34 K/UL

## 2022-10-07 ENCOUNTER — NON-APPOINTMENT (OUTPATIENT)
Age: 82
End: 2022-10-07

## 2023-01-10 ENCOUNTER — APPOINTMENT (OUTPATIENT)
Dept: GERIATRICS | Facility: CLINIC | Age: 83
End: 2023-01-10
Payer: MEDICARE

## 2023-01-10 VITALS
RESPIRATION RATE: 14 BRPM | DIASTOLIC BLOOD PRESSURE: 60 MMHG | HEART RATE: 84 BPM | OXYGEN SATURATION: 99 % | SYSTOLIC BLOOD PRESSURE: 140 MMHG | HEIGHT: 55 IN | BODY MASS INDEX: 22.71 KG/M2 | WEIGHT: 98.13 LBS

## 2023-01-10 VITALS — WEIGHT: 92.8 LBS | BODY MASS INDEX: 24.13 KG/M2

## 2023-01-10 VITALS — DIASTOLIC BLOOD PRESSURE: 70 MMHG | SYSTOLIC BLOOD PRESSURE: 120 MMHG

## 2023-01-10 DIAGNOSIS — E83.39 OTHER DISORDERS OF PHOSPHORUS METABOLISM: ICD-10-CM

## 2023-01-10 DIAGNOSIS — H34.8112 CENTRAL RETINAL VEIN OCCLUSION, RIGHT EYE, STABLE: ICD-10-CM

## 2023-01-10 PROCEDURE — 99214 OFFICE O/P EST MOD 30 MIN: CPT

## 2023-01-10 NOTE — REVIEW OF SYSTEMS
[Feeling Tired] : feeling tired [Eyesight Problems] : eyesight problems [Incontinence] : incontinence [Joint Swelling] : joint swelling [Difficulty Walking] : difficulty walking [Anxiety] : anxiety [Depression] : depression [Recent Change In Weight] : ~T recent weight change [Vision Problems] : vision problems [Joint Pain] : joint pain [Joint Stiffness] : joint stiffness [Muscle Pain] : muscle pain [Negative] : Neurological [Sleep Disturbances] : no sleep disturbances [FreeTextEntry2] : back pain [FreeTextEntry7] : no blood in stool [FreeTextEntry9] : hands [FreeTextEntry1] : fungal changes in finger nails

## 2023-01-10 NOTE — PHYSICAL EXAM
[Normal Outer Ear/Nose] : the ears and nose were normal in appearance [Normal] : normal skin color and pigmentation [No Focal Deficits] : no focal deficits [Oriented To Time, Place, And Person] : oriented to person, place, and time [de-identified] : walks slowly - [de-identified] : kypho scloiosis [de-identified] : slow stooped gait - not magnetic, not wide based [MentalStatusTotal] : 26/27
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
## 2023-01-10 NOTE — ASSESSMENT
[FreeTextEntry1] : Dr LYMAN 0 4/28/21\par check labs now - fasting\par start tylenol 1000mg BID standing up to TID PRN\par advil 1-2 times a day always with food\par given name for pain management to make appointment\par goal is to obtain records from all doctors [place] and [place]\par will get back to me about doctors names etc\par obtain vaccine records\par in future [facility] Annual physical\par \par 12/21/21\par \par mms - 27/28 - does not know name of hospital\par gds 15/30\par recheck labs - h/o anemia\par check iron\par refer to endocrine for osteoporosis\par trial of cymbalta for pain/depression\par to see Dr Martin re possible NPH\par [facility] - falls  prevention\par retina specialist - has appt\par did health care proxy - daughter\par gave MOlst\par \par 3/22/22\par pt with severe OP compression fractures\par got iron\par hb 12\par to get home pt\par mild hydro - ?stones\par thinking re coloscnopy - positive cologuard\par fungal rash - nystop\par consider lidoderm patches uses heat\par \par 6/7/22\par willl recheck cbc and iron studies\par colonscopy - polyps\par has gained    weight\par doing much better\par \par 10/4/22\par pt recovering from URI\par had flu and covid vaccines 2 weeks ago\par will recheck labs today\par \par 1/10/23\par rechecking labs today\par no changes made

## 2023-01-11 LAB
ALBUMIN SERPL ELPH-MCNC: 4.1 G/DL
ALP BLD-CCNC: 65 U/L
ALT SERPL-CCNC: 15 U/L
ANION GAP SERPL CALC-SCNC: 13 MMOL/L
AST SERPL-CCNC: 28 U/L
BASOPHILS # BLD AUTO: 0.03 K/UL
BASOPHILS NFR BLD AUTO: 0.5 %
BILIRUB SERPL-MCNC: <0.2 MG/DL
BUN SERPL-MCNC: 17 MG/DL
CALCIUM SERPL-MCNC: 9.1 MG/DL
CHLORIDE SERPL-SCNC: 96 MMOL/L
CO2 SERPL-SCNC: 24 MMOL/L
CREAT SERPL-MCNC: 0.47 MG/DL
EGFR: 96 ML/MIN/1.73M2
EOSINOPHIL # BLD AUTO: 0.08 K/UL
EOSINOPHIL NFR BLD AUTO: 1.4 %
FERRITIN SERPL-MCNC: 13 NG/ML
GLUCOSE SERPL-MCNC: 96 MG/DL
HCT VFR BLD CALC: 37.1 %
HGB BLD-MCNC: 11.4 G/DL
IMM GRANULOCYTES NFR BLD AUTO: 0.2 %
IRON SATN MFR SERPL: 6 %
IRON SERPL-MCNC: 25 UG/DL
LYMPHOCYTES # BLD AUTO: 1.35 K/UL
LYMPHOCYTES NFR BLD AUTO: 24 %
MAGNESIUM SERPL-MCNC: 2.3 MG/DL
MAN DIFF?: NORMAL
MCHC RBC-ENTMCNC: 27.3 PG
MCHC RBC-ENTMCNC: 30.7 GM/DL
MCV RBC AUTO: 88.8 FL
MONOCYTES # BLD AUTO: 0.59 K/UL
MONOCYTES NFR BLD AUTO: 10.5 %
NEUTROPHILS # BLD AUTO: 3.57 K/UL
NEUTROPHILS NFR BLD AUTO: 63.4 %
PHOSPHATE SERPL-MCNC: 4.2 MG/DL
PLATELET # BLD AUTO: 346 K/UL
POTASSIUM SERPL-SCNC: 4.2 MMOL/L
PROT SERPL-MCNC: 5.9 G/DL
RBC # BLD: 4.18 M/UL
RBC # FLD: 14 %
SODIUM SERPL-SCNC: 133 MMOL/L
TIBC SERPL-MCNC: 403 UG/DL
UIBC SERPL-MCNC: 378 UG/DL
WBC # FLD AUTO: 5.63 K/UL

## 2023-09-19 ENCOUNTER — APPOINTMENT (OUTPATIENT)
Dept: GERIATRICS | Facility: CLINIC | Age: 83
End: 2023-09-19

## 2023-10-03 ENCOUNTER — APPOINTMENT (OUTPATIENT)
Dept: GERIATRICS | Facility: CLINIC | Age: 83
End: 2023-10-03
Payer: MEDICARE

## 2023-10-03 VITALS
BODY MASS INDEX: 22.88 KG/M2 | DIASTOLIC BLOOD PRESSURE: 64 MMHG | WEIGHT: 88 LBS | HEART RATE: 80 BPM | TEMPERATURE: 96.4 F | SYSTOLIC BLOOD PRESSURE: 118 MMHG | OXYGEN SATURATION: 97 %

## 2023-10-03 DIAGNOSIS — R26.81 UNSTEADINESS ON FEET: ICD-10-CM

## 2023-10-03 DIAGNOSIS — D64.9 ANEMIA, UNSPECIFIED: ICD-10-CM

## 2023-10-03 PROCEDURE — 99214 OFFICE O/P EST MOD 30 MIN: CPT

## 2023-10-04 LAB
25(OH)D3 SERPL-MCNC: 54.2 NG/ML
ALBUMIN SERPL ELPH-MCNC: 4.4 G/DL
ALP BLD-CCNC: 53 U/L
ALT SERPL-CCNC: 14 U/L
ANION GAP SERPL CALC-SCNC: 9 MMOL/L
AST SERPL-CCNC: 29 U/L
BILIRUB SERPL-MCNC: 0.2 MG/DL
BUN SERPL-MCNC: 15 MG/DL
CALCIUM SERPL-MCNC: 9.2 MG/DL
CHLORIDE SERPL-SCNC: 94 MMOL/L
CHOLEST SERPL-MCNC: 274 MG/DL
CO2 SERPL-SCNC: 28 MMOL/L
CREAT SERPL-MCNC: 0.57 MG/DL
CRP SERPL-MCNC: <3 MG/L
EGFR: 91 ML/MIN/1.73M2
FERRITIN SERPL-MCNC: 10 NG/ML
GLUCOSE SERPL-MCNC: 99 MG/DL
HCT VFR BLD CALC: 35.5 %
HDLC SERPL-MCNC: 114 MG/DL
HGB BLD-MCNC: 11.1 G/DL
IRON SATN MFR SERPL: 8 %
IRON SERPL-MCNC: 31 UG/DL
LDLC SERPL CALC-MCNC: 151 MG/DL
MCHC RBC-ENTMCNC: 25.9 PG
MCHC RBC-ENTMCNC: 31.3 GM/DL
MCV RBC AUTO: 82.9 FL
NONHDLC SERPL-MCNC: 160 MG/DL
PLATELET # BLD AUTO: 326 K/UL
POTASSIUM SERPL-SCNC: 5 MMOL/L
PROT SERPL-MCNC: 6.2 G/DL
RBC # BLD: 4.28 M/UL
RBC # FLD: 16.2 %
SODIUM SERPL-SCNC: 131 MMOL/L
TIBC SERPL-MCNC: 414 UG/DL
TRIGL SERPL-MCNC: 62 MG/DL
UIBC SERPL-MCNC: 383 UG/DL
WBC # FLD AUTO: 6.82 K/UL

## 2023-10-07 ENCOUNTER — TRANSCRIPTION ENCOUNTER (OUTPATIENT)
Age: 83
End: 2023-10-07

## 2023-10-11 ENCOUNTER — TRANSCRIPTION ENCOUNTER (OUTPATIENT)
Age: 83
End: 2023-10-11

## 2023-10-11 DIAGNOSIS — R05.9 COUGH, UNSPECIFIED: ICD-10-CM

## 2023-10-20 ENCOUNTER — TRANSCRIPTION ENCOUNTER (OUTPATIENT)
Age: 83
End: 2023-10-20

## 2023-10-25 ENCOUNTER — TRANSCRIPTION ENCOUNTER (OUTPATIENT)
Age: 83
End: 2023-10-25

## 2023-10-27 ENCOUNTER — TRANSCRIPTION ENCOUNTER (OUTPATIENT)
Age: 83
End: 2023-10-27

## 2023-10-27 ENCOUNTER — RESULT REVIEW (OUTPATIENT)
Age: 83
End: 2023-10-27

## 2023-10-30 ENCOUNTER — TRANSCRIPTION ENCOUNTER (OUTPATIENT)
Age: 83
End: 2023-10-30

## 2023-10-31 ENCOUNTER — TRANSCRIPTION ENCOUNTER (OUTPATIENT)
Age: 83
End: 2023-10-31

## 2023-11-07 ENCOUNTER — TRANSCRIPTION ENCOUNTER (OUTPATIENT)
Age: 83
End: 2023-11-07

## 2023-11-07 RX ORDER — OMEPRAZOLE 20 MG/1
20 CAPSULE, DELAYED RELEASE ORAL
Qty: 90 | Refills: 3 | Status: ACTIVE | COMMUNITY
Start: 2023-11-07 | End: 1900-01-01

## 2023-11-09 ENCOUNTER — TRANSCRIPTION ENCOUNTER (OUTPATIENT)
Age: 83
End: 2023-11-09

## 2023-11-19 ENCOUNTER — NON-APPOINTMENT (OUTPATIENT)
Age: 83
End: 2023-11-19

## 2023-11-20 ENCOUNTER — APPOINTMENT (OUTPATIENT)
Dept: ENDOCRINOLOGY | Facility: CLINIC | Age: 83
End: 2023-11-20
Payer: MEDICARE

## 2023-11-20 VITALS
HEIGHT: 55 IN | WEIGHT: 88 LBS | BODY MASS INDEX: 20.37 KG/M2 | SYSTOLIC BLOOD PRESSURE: 122 MMHG | DIASTOLIC BLOOD PRESSURE: 68 MMHG

## 2023-11-20 DIAGNOSIS — M80.08XA AGE-RELATED OSTEOPOROSIS WITH CURRENT PATHOLOGICAL FRACTURE, VERTEBRA(E), INITIAL ENCOUNTER FOR FRACTURE: ICD-10-CM

## 2023-11-20 PROCEDURE — 99214 OFFICE O/P EST MOD 30 MIN: CPT

## 2023-11-24 PROBLEM — M80.08XA AGE-RELATED OSTEOPOROSIS WITH CURRENT PATHOLOGICAL FRACTURE, VERTEBRA(E), INITIAL ENCOUNTER FOR FRACTURE: Noted: 2022-02-16

## 2023-12-05 ENCOUNTER — APPOINTMENT (OUTPATIENT)
Dept: DERMATOLOGY | Facility: CLINIC | Age: 83
End: 2023-12-05

## 2023-12-11 ENCOUNTER — TRANSCRIPTION ENCOUNTER (OUTPATIENT)
Age: 83
End: 2023-12-11

## 2024-01-11 ENCOUNTER — APPOINTMENT (OUTPATIENT)
Dept: ENDOCRINOLOGY | Facility: CLINIC | Age: 84
End: 2024-01-11
Payer: MEDICARE

## 2024-01-11 PROCEDURE — 99441: CPT

## 2024-01-22 ENCOUNTER — TRANSCRIPTION ENCOUNTER (OUTPATIENT)
Age: 84
End: 2024-01-22

## 2024-01-30 ENCOUNTER — APPOINTMENT (OUTPATIENT)
Dept: DERMATOLOGY | Facility: CLINIC | Age: 84
End: 2024-01-30
Payer: MEDICARE

## 2024-01-30 VITALS — WEIGHT: 90 LBS | BODY MASS INDEX: 23.86 KG/M2

## 2024-01-30 DIAGNOSIS — L03.019 CELLULITIS OF UNSPECIFIED FINGER: ICD-10-CM

## 2024-01-30 DIAGNOSIS — B35.1 TINEA UNGUIUM: ICD-10-CM

## 2024-01-30 PROCEDURE — 99204 OFFICE O/P NEW MOD 45 MIN: CPT

## 2024-01-30 RX ORDER — CICLOPIROX 80 MG/ML
8 SOLUTION TOPICAL
Qty: 1 | Refills: 11 | Status: ACTIVE | COMMUNITY
Start: 2024-01-30 | End: 1900-01-01

## 2024-01-30 RX ORDER — BETAMETHASONE DIPROPIONATE 0.5 MG/G
0.05 OINTMENT TOPICAL
Qty: 1 | Refills: 2 | Status: ACTIVE | COMMUNITY
Start: 2024-01-30 | End: 1900-01-01

## 2024-02-03 PROBLEM — L03.019 CHRONIC PARONYCHIA OF FINGER: Status: ACTIVE | Noted: 2024-02-03

## 2024-02-03 NOTE — ASSESSMENT
[FreeTextEntry1] : 1) Onychomycosis, chronic, not at goal - failed oral terbinafine. not interested in oral therapies - Reviewed risks (as well as mitigation strategies for adverse drug events as applicable), benefits, and alternatives of therapy - Start ciclopirox soln once daily   2) Chronic paronychia - OK to continue betametasone - Avoid excessive water exposure - Start vinegar soaks TIW

## 2024-02-03 NOTE — HISTORY OF PRESENT ILLNESS
[FreeTextEntry1] : nails cracking, itchy skin [de-identified] : nails cracking location: fingernails, toenails symptoms: painful duration: several years Patient saw a dermatologist in [place] years ago. Patient treated with gold bond crack relief and betamethasone ointment. Has also been on oral terbinafine in the past.  Itchy skin   location: back duration: several years Patient has treated with otc topicals.

## 2024-02-06 ENCOUNTER — APPOINTMENT (OUTPATIENT)
Dept: GERIATRICS | Facility: CLINIC | Age: 84
End: 2024-02-06
Payer: MEDICARE

## 2024-02-06 VITALS
OXYGEN SATURATION: 99 % | WEIGHT: 86.8 LBS | SYSTOLIC BLOOD PRESSURE: 132 MMHG | HEART RATE: 72 BPM | BODY MASS INDEX: 23.01 KG/M2 | DIASTOLIC BLOOD PRESSURE: 68 MMHG | TEMPERATURE: 97.1 F

## 2024-02-06 VITALS — DIASTOLIC BLOOD PRESSURE: 80 MMHG | SYSTOLIC BLOOD PRESSURE: 145 MMHG

## 2024-02-06 DIAGNOSIS — G44.209 TENSION-TYPE HEADACHE, UNSPECIFIED, NOT INTRACTABLE: ICD-10-CM

## 2024-02-06 DIAGNOSIS — R51.9 HEADACHE, UNSPECIFIED: ICD-10-CM

## 2024-02-06 PROCEDURE — G2211 COMPLEX E/M VISIT ADD ON: CPT

## 2024-02-06 PROCEDURE — 99214 OFFICE O/P EST MOD 30 MIN: CPT

## 2024-02-06 NOTE — ASSESSMENT
[FreeTextEntry1] : PT home flu vaccine check labs  f/up on reclast  2/6/24 has f/up re osteoporosis no new meds yet couple of high /borderline BP readings mostly fine worsened headaches (chronic history) no trauma  with chronic poor sleep  checking labs toda offered to order     MRI brain wants to see neuro first given list of providers did not  take cymbalta given in past by me
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
## 2024-02-06 NOTE — PHYSICAL EXAM
[Normal Outer Ear/Nose] : the ears and nose were normal in appearance [Normal] : normal skin color and pigmentation [No Focal Deficits] : no focal deficits [Oriented To Time, Place, And Person] : oriented to person, place, and time [de-identified] : walks slowly - [FreeTextEntry1] : nothing on head scalp neck - points to various places as site of pain [de-identified] : kypho scloiosis [de-identified] : slow stooped gait - not magnetic, not wide based

## 2024-02-09 LAB
ALBUMIN SERPL ELPH-MCNC: 4.2 G/DL
ALP BLD-CCNC: 67 U/L
ALT SERPL-CCNC: 12 U/L
ANION GAP SERPL CALC-SCNC: 10 MMOL/L
AST SERPL-CCNC: 26 U/L
BILIRUB SERPL-MCNC: <0.2 MG/DL
BUN SERPL-MCNC: 18 MG/DL
CALCIUM SERPL-MCNC: 8.5 MG/DL
CHLORIDE SERPL-SCNC: 95 MMOL/L
CHOLEST SERPL-MCNC: 256 MG/DL
CO2 SERPL-SCNC: 28 MMOL/L
CREAT SERPL-MCNC: 0.53 MG/DL
EGFR: 93 ML/MIN/1.73M2
GLUCOSE SERPL-MCNC: 97 MG/DL
HDLC SERPL-MCNC: 101 MG/DL
LDLC SERPL CALC-MCNC: 136 MG/DL
NONHDLC SERPL-MCNC: 155 MG/DL
POTASSIUM SERPL-SCNC: 4.5 MMOL/L
PROT SERPL-MCNC: 6.2 G/DL
SODIUM SERPL-SCNC: 133 MMOL/L
TRIGL SERPL-MCNC: 114 MG/DL

## 2024-02-12 ENCOUNTER — LABORATORY RESULT (OUTPATIENT)
Age: 84
End: 2024-02-12

## 2024-02-13 ENCOUNTER — LABORATORY RESULT (OUTPATIENT)
Age: 84
End: 2024-02-13

## 2024-02-14 ENCOUNTER — LABORATORY RESULT (OUTPATIENT)
Age: 84
End: 2024-02-14

## 2024-03-05 ENCOUNTER — TRANSCRIPTION ENCOUNTER (OUTPATIENT)
Age: 84
End: 2024-03-05

## 2024-03-06 ENCOUNTER — TRANSCRIPTION ENCOUNTER (OUTPATIENT)
Age: 84
End: 2024-03-06

## 2024-03-12 ENCOUNTER — TRANSCRIPTION ENCOUNTER (OUTPATIENT)
Age: 84
End: 2024-03-12

## 2024-03-26 ENCOUNTER — TRANSCRIPTION ENCOUNTER (OUTPATIENT)
Age: 84
End: 2024-03-26

## 2024-03-27 ENCOUNTER — TRANSCRIPTION ENCOUNTER (OUTPATIENT)
Age: 84
End: 2024-03-27

## 2024-04-02 ENCOUNTER — LABORATORY RESULT (OUTPATIENT)
Age: 84
End: 2024-04-02

## 2024-04-05 ENCOUNTER — LABORATORY RESULT (OUTPATIENT)
Age: 84
End: 2024-04-05

## 2024-04-28 ENCOUNTER — TRANSCRIPTION ENCOUNTER (OUTPATIENT)
Age: 84
End: 2024-04-28

## 2024-05-04 ENCOUNTER — TRANSCRIPTION ENCOUNTER (OUTPATIENT)
Age: 84
End: 2024-05-04

## 2024-05-14 ENCOUNTER — APPOINTMENT (OUTPATIENT)
Dept: ENDOCRINOLOGY | Facility: CLINIC | Age: 84
End: 2024-05-14
Payer: MEDICARE

## 2024-05-14 VITALS — BODY MASS INDEX: 19.9 KG/M2 | WEIGHT: 86 LBS | HEIGHT: 55 IN

## 2024-05-14 VITALS — SYSTOLIC BLOOD PRESSURE: 118 MMHG | HEART RATE: 79 BPM | OXYGEN SATURATION: 98 % | DIASTOLIC BLOOD PRESSURE: 60 MMHG

## 2024-05-14 DIAGNOSIS — G89.29 DORSALGIA, UNSPECIFIED: ICD-10-CM

## 2024-05-14 DIAGNOSIS — M54.9 DORSALGIA, UNSPECIFIED: ICD-10-CM

## 2024-05-14 DIAGNOSIS — S22.000A WEDGE COMPRESSION FRACTURE OF UNSPECIFIED THORACIC VERTEBRA, INITIAL ENCOUNTER FOR CLOSED FRACTURE: ICD-10-CM

## 2024-05-14 PROCEDURE — 99214 OFFICE O/P EST MOD 30 MIN: CPT

## 2024-05-14 PROCEDURE — G2211 COMPLEX E/M VISIT ADD ON: CPT

## 2024-05-14 RX ORDER — NYSTATIN 100000 [USP'U]/G
100000 POWDER TOPICAL
Qty: 1 | Refills: 3 | Status: DISCONTINUED | COMMUNITY
Start: 2022-03-22 | End: 2024-05-14

## 2024-05-14 RX ORDER — EFINACONAZOLE 100 MG/ML
10 SOLUTION TOPICAL
Qty: 1 | Refills: 5 | Status: DISCONTINUED | COMMUNITY
Start: 2022-06-07 | End: 2024-05-14

## 2024-05-14 NOTE — HISTORY OF PRESENT ILLNESS
[FreeTextEntry1] : This pleasant 84 year-old lady accompanied by her daughter, Gabrielle, to follow-up care for osteoporosis.  Osteoporosis has been diagnosed on basis of DEXA scan as well as she has had history of multiple fractures, compression vertebral fractures. Per the patient's history, she was on alendronate from [id number]. Thereafter she was likely placed on Forteo for approximately 2 years. Afterwards she had been transitioned to Reclast most likely and received 2-3 doses of Reclast. The last one was in 3/2022.  She complains of pain in her back specifically at the juncture of thoracic and lumbar vertebrae and right-sided sciatic pain. She denies any dental complications with the treatment. She also denies any pain specifically in her groin or pain worsening on walking. Because of her chronic pain and urinary incontinence the overall quality of life is really poor. She is also prone to falls and has been using a cane or walker to ambulate. Important to note she had a tibial stress fractures few years back in spite of treatment with Forteo plus Reclast combination. There is no history of parathyroid disease, thyroid dysfunction organ transplant, celiac disease, cancer radiation treatments, or use of long-term seizure medications. She has had prednisone but no long-term steroid use. She underwent menopause at the age of 52. Mother has osteoporosis. Height loss is noted. She does suffer from heartburn and reflux disease. Patient is a retired dietitian and watches her calcium intake in her diet. She is taking calcium carbonate Tums and has diet focused on calcium intake.   02/28/2022- FOLLOW UP Discussed most recent lab results with the patient. No unexpected findings on the x-ray patient getting MRI by pain management still troubled by the pain. To best of her knowledge patient has received 2 Reclast doses and is due for the third 1. Patient and her daughter will arrange for me to review the last endocrine notes. She has been having hypophosphatemia cause unknown. I have advised her to replenish by oral supplementation, but patient is hesitant due to fluid concerns. Electrolyte imbalances noted advised to establish with nephrology as well. No obvious endocrinopathy noted to explain polyuria polydipsia. Discussed with the patient to maintain optimal calcium in the diet. Advised to get dental evaluation. She has tolerated Reclast well on prior dosing. I will mobilize to set up Reclast dosing.  11/20/2023- FOLLOW UP Overall doing well. May have lost some height, though no reported fracturs. Taking her calcium and vitamin D through diet and supplements. Will obtain current DEXA scan to gauge bone mineral density stability. If significantly low, may resume osteoporosis therapy. C-terminal telopeptide could be checked to gauge bone turnover if results are not conclusive. Will follow up results with patient.  All questions and concerns were fully addressed to patient's satisfaction. Patient and family are in agreement with stated plan. Return to clinic in 6 months.   5/14/2024- FOLLOW UP Patient is doing well overall without any new or worsening complaints.  She is tolerating her monthly Evenity injections without significant adverse effects. We will check her CMP today to make sure her calcium levels are still sufficient after starting therapy. Otherwise she will continue monthly Evenity to complete 12 months of monthly injections.  We will repeat the bone density scan 1 year from the most recent in December 2024.  Routine osteoporosis labs will be obtained around that time as well.  Patient will follow-up in clinic after those studies have resulted.  At that time we will discuss transitioning to either Prolia or Reclast depending on degree of bone density gain from 1 year of Evenity. All questions and concerns were fully addressed to patient's satisfaction. Patient is in agreement with stated plan.

## 2024-05-14 NOTE — REVIEW OF SYSTEMS
[Joint Pain] : joint pain [Muscle Weakness] : muscle weakness [Myalgia] : myalgia  [Back Pain] : back pain [Difficulty Walking] : difficulty walking [Poor Balance] : poor balance [Chest Pain] : no chest pain [Palpitations] : no palpitations [Shortness Of Breath] : no shortness of breath [Nausea] : no nausea [Constipation] : no constipation [Abdominal Pain] : no abdominal pain [Polyuria] : no polyuria [Dysuria] : no dysuria [Dizziness] : no dizziness [Confusion] : no confusion [Tremors] : no tremors [Pain/Numbness of Digits] : no pain/numbness of digits [Depression] : no depression [Polydipsia] : no polydipsia

## 2024-05-14 NOTE — REASON FOR VISIT
[Follow - Up] : a follow-up visit [Osteoporosis] : osteoporosis [Family Member] : family member [Source: ______] : History obtained from [facility]

## 2024-05-14 NOTE — PHYSICAL EXAM
[Alert] : alert [No Acute Distress] : no acute distress [Normal Voice/Communication] : normal voice communication [EOMI] : extra ocular movement intact [Normal Hearing] : hearing was normal [No Respiratory Distress] : no respiratory distress [Normal Rate and Effort] : normal respiratory rate and effort [Normal Rate] : heart rate was normal [No Edema] : no peripheral edema [Not Distended] : not distended [Kyphosis] : kyphosis present [Scoliosis] : scoliosis present [No Rash] : no rash [No Motor Deficits] : the motor exam was normal [Oriented x3] : oriented to person, place, and time [Normal Affect] : the affect was normal [Recent Memory Normal] : recent memory was not impaired [Normal Insight/Judgement] : insight and judgment were intact [Normal Mood] : the mood was normal [Remote Memory Normal] : remote memory was not impaired [Healthy Appearance] : healthy appearance [Well Developed] : well developed [de-identified] : Stooped posture. [de-identified] : Slow gait with aid.

## 2024-05-15 LAB
ALBUMIN SERPL ELPH-MCNC: 4.1 G/DL
ALP BLD-CCNC: 75 U/L
ALT SERPL-CCNC: 15 U/L
ANION GAP SERPL CALC-SCNC: 12 MMOL/L
AST SERPL-CCNC: 31 U/L
BILIRUB SERPL-MCNC: <0.2 MG/DL
BUN SERPL-MCNC: 19 MG/DL
CALCIUM SERPL-MCNC: 9.4 MG/DL
CHLORIDE SERPL-SCNC: 93 MMOL/L
CO2 SERPL-SCNC: 24 MMOL/L
CREAT SERPL-MCNC: 0.54 MG/DL
EGFR: 92 ML/MIN/1.73M2
GLUCOSE SERPL-MCNC: 108 MG/DL
POTASSIUM SERPL-SCNC: 4.6 MMOL/L
PROT SERPL-MCNC: 5.9 G/DL
SODIUM SERPL-SCNC: 130 MMOL/L

## 2024-05-20 ENCOUNTER — TRANSCRIPTION ENCOUNTER (OUTPATIENT)
Age: 84
End: 2024-05-20

## 2024-05-28 ENCOUNTER — APPOINTMENT (OUTPATIENT)
Dept: GERIATRICS | Facility: CLINIC | Age: 84
End: 2024-05-28
Payer: MEDICARE

## 2024-05-28 VITALS
WEIGHT: 86 LBS | TEMPERATURE: 96.2 F | BODY MASS INDEX: 22.8 KG/M2 | DIASTOLIC BLOOD PRESSURE: 62 MMHG | OXYGEN SATURATION: 97 % | SYSTOLIC BLOOD PRESSURE: 118 MMHG | HEART RATE: 76 BPM

## 2024-05-28 DIAGNOSIS — M81.0 AGE-RELATED OSTEOPOROSIS W/OUT CURRENT PATHOLOGICAL FRACTURE: ICD-10-CM

## 2024-05-28 DIAGNOSIS — E87.1 HYPO-OSMOLALITY AND HYPONATREMIA: ICD-10-CM

## 2024-05-28 DIAGNOSIS — Z23 ENCOUNTER FOR IMMUNIZATION: ICD-10-CM

## 2024-05-28 DIAGNOSIS — R25.2 CRAMP AND SPASM: ICD-10-CM

## 2024-05-28 PROCEDURE — 90677 PCV20 VACCINE IM: CPT

## 2024-05-28 PROCEDURE — 99214 OFFICE O/P EST MOD 30 MIN: CPT | Mod: 25

## 2024-05-28 PROCEDURE — G0009: CPT

## 2024-05-28 RX ORDER — ADHESIVE TAPE 3"X 2.3 YD
200 TAPE, NON-MEDICATED TOPICAL
Refills: 0 | Status: ACTIVE | COMMUNITY
Start: 2024-05-28
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
## 2024-05-28 NOTE — REVIEW OF SYSTEMS
[Limb Pain] : limb pain [Negative] : Endocrine [FreeTextEntry2] : leg cramps [FreeTextEntry4] : ear ache rt [de-identified] : hives at night

## 2024-05-28 NOTE — ASSESSMENT
[FreeTextEntry1] : PT home flu vaccine check labs  f/up on reclast  2/6/24 has f/up re osteoporosis no new meds yet couple of high /borderline BP readings mostly fine worsened headaches (chronic history) no trauma  with chronic poor sleep  checking labs toda offered to order     MRI brain wants to see neuro first given list of providers did not  take cymbalta given in past by me  5/28/24 prevnar given left arm labs wnl ear cleaned of cerumen suggested quinine/tonic fo rleg cramps as well as  exercise no hives today OP - treated

## 2024-05-28 NOTE — PHYSICAL EXAM
[Normal Outer Ear/Nose] : the ears and nose were normal in appearance [Edema] : edema was not present [Normal] : normal skin color and pigmentation [No Focal Deficits] : no focal deficits [Oriented To Time, Place, And Person] : oriented to person, place, and time [de-identified] : walks slowly - [FreeTextEntry1] : \ [de-identified] : rt ear - cerume removed, tiny spot of irritation , left is clear [de-identified] : good DP pulses, legs normal [de-identified] : kypho scloiosis [de-identified] : slow stooped gait - not magnetic, not wide based [de-identified] : no hives, no rashes

## 2024-08-06 ENCOUNTER — TRANSCRIPTION ENCOUNTER (OUTPATIENT)
Age: 84
End: 2024-08-06

## 2024-08-07 ENCOUNTER — TRANSCRIPTION ENCOUNTER (OUTPATIENT)
Age: 84
End: 2024-08-07

## 2024-08-07 PROBLEM — M25.562 ACUTE PAIN OF LEFT KNEE: Status: ACTIVE | Noted: 2024-08-07

## 2024-08-08 ENCOUNTER — TRANSCRIPTION ENCOUNTER (OUTPATIENT)
Age: 84
End: 2024-08-08

## 2024-08-29 ENCOUNTER — TRANSCRIPTION ENCOUNTER (OUTPATIENT)
Age: 84
End: 2024-08-29

## 2024-08-30 ENCOUNTER — TRANSCRIPTION ENCOUNTER (OUTPATIENT)
Age: 84
End: 2024-08-30

## 2024-09-03 ENCOUNTER — TRANSCRIPTION ENCOUNTER (OUTPATIENT)
Age: 84
End: 2024-09-03

## 2024-09-04 ENCOUNTER — TRANSCRIPTION ENCOUNTER (OUTPATIENT)
Age: 84
End: 2024-09-04

## 2024-09-11 ENCOUNTER — TRANSCRIPTION ENCOUNTER (OUTPATIENT)
Age: 84
End: 2024-09-11

## 2024-09-17 ENCOUNTER — TRANSCRIPTION ENCOUNTER (OUTPATIENT)
Age: 84
End: 2024-09-17

## 2024-11-12 ENCOUNTER — APPOINTMENT (OUTPATIENT)
Dept: ENDOCRINOLOGY | Facility: CLINIC | Age: 84
End: 2024-11-12
Payer: MEDICARE

## 2024-11-12 VITALS
BODY MASS INDEX: 19.44 KG/M2 | WEIGHT: 84 LBS | HEIGHT: 55 IN | DIASTOLIC BLOOD PRESSURE: 52 MMHG | SYSTOLIC BLOOD PRESSURE: 114 MMHG | HEART RATE: 63 BPM | OXYGEN SATURATION: 100 %

## 2024-11-12 DIAGNOSIS — R29.6 REPEATED FALLS: ICD-10-CM

## 2024-11-12 DIAGNOSIS — M81.0 AGE-RELATED OSTEOPOROSIS W/OUT CURRENT PATHOLOGICAL FRACTURE: ICD-10-CM

## 2024-11-12 DIAGNOSIS — R26.81 UNSTEADINESS ON FEET: ICD-10-CM

## 2024-11-12 DIAGNOSIS — L30.9 DERMATITIS, UNSPECIFIED: ICD-10-CM

## 2024-11-12 PROCEDURE — 99214 OFFICE O/P EST MOD 30 MIN: CPT

## 2024-11-12 PROCEDURE — G2211 COMPLEX E/M VISIT ADD ON: CPT

## 2024-11-13 LAB
25(OH)D3 SERPL-MCNC: 48.9 NG/ML
ALBUMIN SERPL ELPH-MCNC: 3.9 G/DL
ALP BLD-CCNC: 71 U/L
ALT SERPL-CCNC: 14 U/L
ANION GAP SERPL CALC-SCNC: 9 MMOL/L
AST SERPL-CCNC: 26 U/L
BILIRUB SERPL-MCNC: <0.2 MG/DL
BUN SERPL-MCNC: 17 MG/DL
CALCIUM SERPL-MCNC: 9 MG/DL
CHLORIDE SERPL-SCNC: 93 MMOL/L
CO2 SERPL-SCNC: 26 MMOL/L
CREAT SERPL-MCNC: 0.47 MG/DL
EGFR: 95 ML/MIN/1.73M2
GLUCOSE SERPL-MCNC: 122 MG/DL
POTASSIUM SERPL-SCNC: 4.4 MMOL/L
PROT SERPL-MCNC: 5.6 G/DL
SODIUM SERPL-SCNC: 128 MMOL/L

## 2024-12-16 ENCOUNTER — RESULT REVIEW (OUTPATIENT)
Age: 84
End: 2024-12-16

## 2025-01-01 ENCOUNTER — APPOINTMENT (OUTPATIENT)
Dept: GERIATRICS | Facility: CLINIC | Age: 85
End: 2025-01-01
Payer: MEDICARE

## 2025-01-01 VITALS
BODY MASS INDEX: 22.8 KG/M2 | WEIGHT: 86 LBS | SYSTOLIC BLOOD PRESSURE: 110 MMHG | TEMPERATURE: 97 F | DIASTOLIC BLOOD PRESSURE: 62 MMHG | OXYGEN SATURATION: 98 % | HEART RATE: 57 BPM

## 2025-01-01 DIAGNOSIS — E87.1 HYPO-OSMOLALITY AND HYPONATREMIA: ICD-10-CM

## 2025-01-01 DIAGNOSIS — E83.39 OTHER DISORDERS OF PHOSPHORUS METABOLISM: ICD-10-CM

## 2025-01-01 DIAGNOSIS — R42 DIZZINESS AND GIDDINESS: ICD-10-CM

## 2025-01-01 DIAGNOSIS — G44.209 TENSION-TYPE HEADACHE, UNSPECIFIED, NOT INTRACTABLE: ICD-10-CM

## 2025-01-01 DIAGNOSIS — M25.562 PAIN IN LEFT KNEE: ICD-10-CM

## 2025-01-01 DIAGNOSIS — R74.8 ABNORMAL LEVELS OF OTHER SERUM ENZYMES: ICD-10-CM

## 2025-01-01 LAB
ALBUMIN SERPL ELPH-MCNC: 4.1 G/DL
ALP BLD-CCNC: 51 U/L
ALT SERPL-CCNC: 208 U/L
ANION GAP SERPL CALC-SCNC: 11 MMOL/L
AST SERPL-CCNC: 421 U/L
BASOPHILS # BLD AUTO: 0.04 K/UL
BASOPHILS NFR BLD AUTO: 0.4 %
BILIRUB SERPL-MCNC: 0.3 MG/DL
BUN SERPL-MCNC: 23 MG/DL
CALCIUM SERPL-MCNC: 8.8 MG/DL
CHLORIDE SERPL-SCNC: 98 MMOL/L
CO2 SERPL-SCNC: 27 MMOL/L
CREAT SERPL-MCNC: 0.62 MG/DL
EGFR: 89 ML/MIN/1.73M2
EOSINOPHIL # BLD AUTO: 0.01 K/UL
EOSINOPHIL NFR BLD AUTO: 0.1 %
GLUCOSE SERPL-MCNC: 112 MG/DL
HCT VFR BLD CALC: 32.1 %
HGB BLD-MCNC: 9.8 G/DL
IMM GRANULOCYTES NFR BLD AUTO: 0.4 %
LYMPHOCYTES # BLD AUTO: 1.78 K/UL
LYMPHOCYTES NFR BLD AUTO: 19.1 %
MAGNESIUM SERPL-MCNC: 2.1 MG/DL
MAN DIFF?: NORMAL
MCHC RBC-ENTMCNC: 24.3 PG
MCHC RBC-ENTMCNC: 30.5 G/DL
MCV RBC AUTO: 79.7 FL
MONOCYTES # BLD AUTO: 1.18 K/UL
MONOCYTES NFR BLD AUTO: 12.6 %
NEUTROPHILS # BLD AUTO: 6.29 K/UL
NEUTROPHILS NFR BLD AUTO: 67.4 %
PHOSPHATE SERPL-MCNC: 2.9 MG/DL
PLATELET # BLD AUTO: 346 K/UL
POTASSIUM SERPL-SCNC: 4.9 MMOL/L
PROT SERPL-MCNC: 6 G/DL
RBC # BLD: 4.03 M/UL
RBC # FLD: 16.5 %
SODIUM SERPL-SCNC: 135 MMOL/L
WBC # FLD AUTO: 9.34 K/UL

## 2025-01-01 PROCEDURE — 36415 COLL VENOUS BLD VENIPUNCTURE: CPT

## 2025-01-01 PROCEDURE — 99213 OFFICE O/P EST LOW 20 MIN: CPT

## 2025-01-01 RX ORDER — CHLORHEXIDINE GLUCONATE 4 %
LIQUID (ML) TOPICAL
Refills: 0 | Status: ACTIVE | COMMUNITY

## 2025-01-01 RX ORDER — CETIRIZINE HYDROCHLORIDE 10 MG/1
10 TABLET, FILM COATED ORAL
Refills: 0 | Status: ACTIVE | COMMUNITY
Start: 2025-01-01

## 2025-01-24 PROBLEM — R42 DIZZINESS: Status: ACTIVE | Noted: 2025-01-01

## 2025-01-24 PROBLEM — G44.209 ACUTE NON INTRACTABLE TENSION-TYPE HEADACHE: Status: RESOLVED | Noted: 2024-01-01 | Resolved: 2025-01-01

## 2025-01-24 PROBLEM — M25.562 ACUTE PAIN OF LEFT KNEE: Status: RESOLVED | Noted: 2024-01-01 | Resolved: 2025-01-01

## 2025-01-25 PROBLEM — R74.8 ELEVATED LIVER ENZYMES: Status: ACTIVE | Noted: 2025-01-01

## 2025-01-28 ENCOUNTER — TRANSCRIPTION ENCOUNTER (OUTPATIENT)
Age: 85
End: 2025-01-28

## 2025-01-29 LAB
ALBUMIN SERPL ELPH-MCNC: 4 G/DL
ALP BLD-CCNC: 57 U/L
ALT SERPL-CCNC: 135 U/L
ANION GAP SERPL CALC-SCNC: 10 MMOL/L
AST SERPL-CCNC: 195 U/L
BILIRUB SERPL-MCNC: <0.2 MG/DL
BUN SERPL-MCNC: 22 MG/DL
CALCIUM SERPL-MCNC: 8.9 MG/DL
CHLORIDE SERPL-SCNC: 98 MMOL/L
CK SERPL-CCNC: 898 U/L
CO2 SERPL-SCNC: 26 MMOL/L
CREAT SERPL-MCNC: 0.54 MG/DL
EGFR: 92 ML/MIN/1.73M2
GLUCOSE SERPL-MCNC: 110 MG/DL
INR PPP: 0.97 RATIO
POTASSIUM SERPL-SCNC: 4.8 MMOL/L
PROT SERPL-MCNC: 5.9 G/DL
PT BLD: 11.5 SEC
SODIUM SERPL-SCNC: 135 MMOL/L

## 2025-02-04 ENCOUNTER — APPOINTMENT (OUTPATIENT)
Dept: GERIATRICS | Facility: CLINIC | Age: 85
End: 2025-02-04

## 2025-02-07 ENCOUNTER — TRANSCRIPTION ENCOUNTER (OUTPATIENT)
Age: 85
End: 2025-02-07

## 2025-02-12 ENCOUNTER — TRANSCRIPTION ENCOUNTER (OUTPATIENT)
Age: 85
End: 2025-02-12

## 2025-05-05 ENCOUNTER — APPOINTMENT (OUTPATIENT)
Dept: ENDOCRINOLOGY | Facility: CLINIC | Age: 85
End: 2025-05-05